# Patient Record
Sex: FEMALE | Race: WHITE | Employment: OTHER | URBAN - METROPOLITAN AREA
[De-identification: names, ages, dates, MRNs, and addresses within clinical notes are randomized per-mention and may not be internally consistent; named-entity substitution may affect disease eponyms.]

---

## 2017-07-24 PROBLEM — I10 ESSENTIAL HYPERTENSION: Status: ACTIVE | Noted: 2017-07-24

## 2017-09-07 ENCOUNTER — HOSPITAL ENCOUNTER (OUTPATIENT)
Age: 67
Discharge: HOME | End: 2017-09-07
Payer: MEDICARE

## 2017-09-07 DIAGNOSIS — I10 ESSENTIAL HYPERTENSION: ICD-10-CM

## 2017-09-07 LAB
ABSOLUTE EOS #: 0.2 K/UL (ref 0–0.4)
ABSOLUTE LYMPH #: 1.4 K/UL (ref 1–4.8)
ABSOLUTE MONO #: 0.5 K/UL (ref 0.1–1.3)
ALBUMIN SERPL-MCNC: 4.2 G/DL (ref 3.5–5.2)
ALBUMIN/GLOBULIN RATIO: ABNORMAL (ref 1–2.5)
ALP BLD-CCNC: 50 U/L (ref 35–104)
ALT SERPL-CCNC: 17 U/L (ref 5–33)
ANION GAP SERPL CALCULATED.3IONS-SCNC: 11 MMOL/L (ref 9–17)
AST SERPL-CCNC: 15 U/L
BASOPHILS # BLD: 1 %
BASOPHILS ABSOLUTE: 0.1 K/UL (ref 0–0.2)
BILIRUB SERPL-MCNC: 0.66 MG/DL (ref 0.3–1.2)
BUN BLDV-MCNC: 20 MG/DL (ref 8–23)
BUN/CREAT BLD: ABNORMAL (ref 9–20)
CALCIUM SERPL-MCNC: 9.2 MG/DL (ref 8.6–10.4)
CHLORIDE BLD-SCNC: 103 MMOL/L (ref 98–107)
CHOLESTEROL/HDL RATIO: 3.3
CHOLESTEROL: 211 MG/DL
CO2: 28 MMOL/L (ref 20–31)
CREAT SERPL-MCNC: 0.85 MG/DL (ref 0.5–0.9)
DIFFERENTIAL TYPE: ABNORMAL
EOSINOPHILS RELATIVE PERCENT: 5 %
GFR AFRICAN AMERICAN: >60 ML/MIN
GFR NON-AFRICAN AMERICAN: >60 ML/MIN
GFR SERPL CREATININE-BSD FRML MDRD: ABNORMAL ML/MIN/{1.73_M2}
GFR SERPL CREATININE-BSD FRML MDRD: ABNORMAL ML/MIN/{1.73_M2}
GLUCOSE BLD-MCNC: 104 MG/DL (ref 70–99)
HCT VFR BLD CALC: 45 % (ref 36–46)
HDLC SERPL-MCNC: 63 MG/DL
HEMOGLOBIN: 15.1 G/DL (ref 12–16)
LDL CHOLESTEROL: 118 MG/DL (ref 0–130)
LYMPHOCYTES # BLD: 28 %
MCH RBC QN AUTO: 30.2 PG (ref 26–34)
MCHC RBC AUTO-ENTMCNC: 33.5 G/DL (ref 31–37)
MCV RBC AUTO: 90.1 FL (ref 80–100)
MONOCYTES # BLD: 10 %
PDW BLD-RTO: 13.9 % (ref 11.5–14.9)
PLATELET # BLD: 142 K/UL (ref 150–450)
PLATELET ESTIMATE: ABNORMAL
PMV BLD AUTO: 9.7 FL (ref 6–12)
POTASSIUM SERPL-SCNC: 4.4 MMOL/L (ref 3.7–5.3)
RBC # BLD: 5 M/UL (ref 4–5.2)
RBC # BLD: ABNORMAL 10*6/UL
SEG NEUTROPHILS: 56 %
SEGMENTED NEUTROPHILS ABSOLUTE COUNT: 2.8 K/UL (ref 1.3–9.1)
SODIUM BLD-SCNC: 142 MMOL/L (ref 135–144)
TOTAL PROTEIN: 7.1 G/DL (ref 6.4–8.3)
TRIGL SERPL-MCNC: 149 MG/DL
TSH SERPL DL<=0.05 MIU/L-ACNC: 2.11 MIU/L (ref 0.3–5)
VLDLC SERPL CALC-MCNC: ABNORMAL MG/DL (ref 1–30)
WBC # BLD: 4.9 K/UL (ref 3.5–11)
WBC # BLD: ABNORMAL 10*3/UL

## 2017-09-07 PROCEDURE — 80053 COMPREHEN METABOLIC PANEL: CPT

## 2017-09-07 PROCEDURE — 36415 COLL VENOUS BLD VENIPUNCTURE: CPT

## 2017-09-07 PROCEDURE — 80061 LIPID PANEL: CPT

## 2017-09-07 PROCEDURE — 85025 COMPLETE CBC W/AUTO DIFF WBC: CPT

## 2017-09-07 PROCEDURE — 84443 ASSAY THYROID STIM HORMONE: CPT

## 2018-01-05 ENCOUNTER — HOSPITAL ENCOUNTER (OUTPATIENT)
Age: 68
Discharge: HOME | End: 2018-01-05
Payer: MEDICARE

## 2018-01-05 ENCOUNTER — HOSPITAL ENCOUNTER (OUTPATIENT)
Dept: GENERAL RADIOLOGY | Age: 68
Discharge: HOME | End: 2018-01-05
Payer: MEDICARE

## 2018-01-05 DIAGNOSIS — M79.605 PAIN OF LEFT LOWER EXTREMITY: ICD-10-CM

## 2018-01-05 PROCEDURE — 72100 X-RAY EXAM L-S SPINE 2/3 VWS: CPT

## 2018-01-22 PROBLEM — M54.50 ACUTE LEFT-SIDED LOW BACK PAIN: Status: ACTIVE | Noted: 2018-01-22

## 2018-02-02 ENCOUNTER — HOSPITAL ENCOUNTER (OUTPATIENT)
Dept: MRI IMAGING | Age: 68
Discharge: HOME | End: 2018-02-04
Payer: MEDICARE

## 2018-02-02 DIAGNOSIS — M54.42 ACUTE LEFT-SIDED LOW BACK PAIN WITH LEFT-SIDED SCIATICA: ICD-10-CM

## 2018-02-02 PROCEDURE — 72148 MRI LUMBAR SPINE W/O DYE: CPT

## 2018-02-05 PROBLEM — M51.26 LUMBAR HERNIATED DISC: Status: ACTIVE | Noted: 2018-02-05

## 2018-02-14 DIAGNOSIS — M54.42 ACUTE LEFT-SIDED LOW BACK PAIN WITH LEFT-SIDED SCIATICA: Primary | ICD-10-CM

## 2018-02-15 ENCOUNTER — HOSPITAL ENCOUNTER (OUTPATIENT)
Dept: GENERAL RADIOLOGY | Facility: CLINIC | Age: 68
Discharge: HOME | End: 2018-02-17
Payer: MEDICARE

## 2018-02-15 ENCOUNTER — HOSPITAL ENCOUNTER (OUTPATIENT)
Facility: CLINIC | Age: 68
Discharge: HOME | End: 2018-02-17
Payer: MEDICARE

## 2018-02-15 ENCOUNTER — INITIAL CONSULT (OUTPATIENT)
Dept: NEUROSURGERY | Age: 68
End: 2018-02-15
Payer: MEDICARE

## 2018-02-15 VITALS
SYSTOLIC BLOOD PRESSURE: 150 MMHG | DIASTOLIC BLOOD PRESSURE: 102 MMHG | BODY MASS INDEX: 31.39 KG/M2 | HEIGHT: 67 IN | WEIGHT: 200 LBS | HEART RATE: 66 BPM

## 2018-02-15 DIAGNOSIS — G89.29 CHRONIC MIDLINE LOW BACK PAIN WITH LEFT-SIDED SCIATICA: Primary | ICD-10-CM

## 2018-02-15 DIAGNOSIS — G89.29 CHRONIC MIDLINE LOW BACK PAIN WITH LEFT-SIDED SCIATICA: ICD-10-CM

## 2018-02-15 DIAGNOSIS — M54.42 CHRONIC MIDLINE LOW BACK PAIN WITH LEFT-SIDED SCIATICA: ICD-10-CM

## 2018-02-15 DIAGNOSIS — M54.42 CHRONIC MIDLINE LOW BACK PAIN WITH LEFT-SIDED SCIATICA: Primary | ICD-10-CM

## 2018-02-15 PROCEDURE — 72120 X-RAY BEND ONLY L-S SPINE: CPT

## 2018-02-15 PROCEDURE — 99203 OFFICE O/P NEW LOW 30 MIN: CPT | Performed by: NEUROLOGICAL SURGERY

## 2018-02-15 NOTE — PROGRESS NOTES
[facility] PHYSICIANS  [facility] NEUROSURGERY  [address] [address]  Dept: [phone]    Patient: Yazmin Hirsch  YOB: 1950  Date: 2/15/18    The patient is a 77 y.o. female who presents today for consult of the following problems:     Chief Complaint   Patient presents with    Other     Lumbar spondylotic myelopathy        Real Flores MD request and evaluation of lumbar spondylotic myelopathy. HPI:     Yazmin Hirsch is a 77 y.o.  female on whom neurosurgical consultation was requested by Real Flores MD for management of lumbar spondylotic myelopathy. The patient reports that her pain is predominantly in her left lower extremity although she occasionally does experience pain in the right lower extremity. Her pain level varies between 7 and 10/10. The pain is aggravated by standing for long periods of time. The pain is alleviated by sitting. She describes the pain as \"stabbing\" and \"numbness\". She denies all other neurological deficit. 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 Danny Lainez     Family History   Problem Relation Age of Onset    Cancer Mother      lung    High Blood Pressure Brother     Stroke Brother 40    High Blood Pressure Brother      Current Outpatient Prescriptions on File Prior to Visit   Medication Sig Dispense Refill    HYDROcodone-acetaminophen (NORCO)  MG per tablet Take 1 tablet by mouth 2 times daily for 15 days.  30 tablet 0    losartan (COZAAR) 100 MG tablet       carvedilol (COREG) 25 MG tablet Take 1 tablet by mouth 2 times daily 60 tablet 3    azelastine (OPTIVAR) 0.05 % ophthalmic solution Place 1 drop into both eyes 2 times daily 1 Bottle 1    Multiple Vitamins-Minerals (THERAPEUTIC MULTIVITAMIN-MINERALS) tablet Take 1 tablet by mouth daily      flyticasone (CUTIVATE) 0.005 % ointment Apply topically 2 times daily PRN 30 g 2     No current facility-administered medications on file prior to visit. Social History   Substance Use Topics    Smoking status: Never Smoker    Smokeless tobacco: Never Used    Alcohol use No       Allergies   Allergen Reactions    Formaldehyde      Any cosmetics, plastics, contact lens, glasses, nose guards to glasses cause localized irritation, redness, and itching. No Hives    Sulfa Antibiotics Swelling       Review of Systems  Constitutional: Negative for activity change and appetite change. HENT: Negative for ear pain and facial swelling. Eyes: Negative for discharge and itching. Respiratory: Negative for choking and chest tightness. Cardiovascular: Negative for chest pain and leg swelling. + Hypertension  Gastrointestinal: Negative for nausea and abdominal pain. + Diverticulitis  Endocrine: Negative for cold intolerance and heat intolerance. Genitourinary: Negative for frequency and flank pain. Musculoskeletal: Negative for myalgias and joint swelling. Skin: Negative for rash and wound. Allergic/Immunologic: Negative for environmental allergies and food allergies. Hematological: Negative for adenopathy. Does not bruise/bleed easily. Psychiatric/Behavioral: Negative for self-injury. The patient is not nervous/anxious. Physical Exam:      BP (!) 150/102   Pulse 66   Ht 5' 7\" (1.702 m)   Wt 200 lb (90.7 kg)   BMI 31.32 kg/m²   Estimated body mass index is 31.32 kg/m² as calculated from the following:    Height as of this encounter: 5' 7\" (1.702 m). Weight as of this encounter: 200 lb (90.7 kg). General:  Rafiq White is a 77y.o. year old female who appears her stated age.    HEENT: Normocephalic

## 2018-02-21 ENCOUNTER — HOSPITAL ENCOUNTER (OUTPATIENT)
Dept: PHYSICAL THERAPY | Age: 68
Setting detail: THERAPIES SERIES
Discharge: HOME | End: 2018-02-21
Payer: MEDICARE

## 2018-02-21 PROCEDURE — G8979 MOBILITY GOAL STATUS: HCPCS

## 2018-02-21 PROCEDURE — 97162 PT EVAL MOD COMPLEX 30 MIN: CPT

## 2018-02-21 PROCEDURE — 97110 THERAPEUTIC EXERCISES: CPT

## 2018-02-21 PROCEDURE — G8978 MOBILITY CURRENT STATUS: HCPCS

## 2018-02-21 NOTE — CONSULTS
[x] Yes  [] No Location: Low back pain radicular symptoms L LE to top of foot Pain Rating: (0-10 scale) 8-10/10  Pain altered Tx:  [] Yes  [] No  Action:  Symptoms:  [] Improving [] Worsening [x] Same  Better:  [] AM    [] PM    [] Sit    [] Rise/Sit    []Stand    [] Walk    [] Lying    [] Other:  Worse: [] AM    [] PM    [] Sit    [] Rise/Sit    [x]Stand    [] Walk    [x] Lying on side   [] Bend                             [] Valsalva    [] Other:  Sleep: [] OK    [x] Disturbed    Objective:      STRENGTH  STRENGTH  ROM    Left Right  Left Right Cervical    C5 Shld Abd   L1-2 Hip Flex 5 5 Flexion    Shld Flexion   Hip Abd 5 5 Extension    Shld IR   L3-4 Knee Ext 5 5 Rotation L R   Shld ER   L4 Ankle DF 4+ 5 Sidebend L R   C6 Elb Flex   L5 EHL 4+ 5 Retraction    C7 Elb Ext   S1 Plant. Flex   Lumbar    C8 EPL   Abdominals 4+ 4+ Flexion 90%p   T1 Fing Abd   Erector Spinae 4+ 4+ Extension 40%         Rotation L 50% R 75%      Right shifted   Sidebend L 50%p R75%         UE/LE                      Left rotated thoracic spine multisegmental                                            TESTS (+/-) LEFT RIGHT Not Tested   SLR [x] sit [] supine +  []   Hamstring (SLR)   []   SKTC   []   DKTC   []   Slump/Dural +  []   SI JT   []   EDWIN - - []   Joint Mobility   []   Cerv. Comp   []   Cerv. Distraction   []   Cerv. Alar/Transverse   []   Vertebral Artery   []   Adsons   []   Melvin Spaulding   []   Jo Tests ? Pain ? Pain No Change Not Tested   RFIS [] [] [] []   DERRICK [] [] [] []   RFIL [] [] [] []   REIL [] [] [] []   Rep Prot [] [] [] []   Rep Retract [] [] [] []     Pelvic obliquity/asymetry:  Positive standing forward bend left, Stork + left, left anterior rotated pallavi, left upslip.       OBSERVATION No Deficit Deficit Not Tested Comments   Posture       Forward Head [] [] []    Rounded Shoulders [] [] []    Kyphosis [] [x] [] Left rotated Tspine   Lordosis [] [] []    Lateral Shift [] [] []    Scoliosis [] [] []    Iliac Demo  [x] Written  Comprehension of Education:  [x] Verbalizes understanding. [x] Demonstrates understanding. [x] Needs Review. [] Demonstrates/verbalizes understanding of HEP/Ed previously given. Treatment Plan:  [x] Therapeutic Exercise    [x] Modalities:  [] Therapeutic Activity    [] Ultrasound  [x] Electrical Stimulation  [] Gait Training     [x]Massage       [x] Lumbar/Cervical Traction  [x] Neuromuscular Re-education [x] Cold/hotpack [] Iontophoresis: 4 mg/mL  [x] Instruction in HEP             Dexamethasone Sodium  [x] Manual Therapy             Phosphate 40-80 mAmin  [x] Aquatic Therapy   [x] Other: DRY NEEDLING    []  Medication allergies reviewed for use of    Dexamethasone Sodium Phosphate 4mg/ml     with iontophoresis treatments. Pt is not allergic. Frequency:  2 x/week for 18 visits    Todays Treatment:  Modalities:   Precautions:  Exercises:  Exercise Reps/ Time Weight/ Level Comments   SKTC 5sec hold x 10     Hooklie rotation 5 sec hold x 10                       Other:    Specific Instructions for next treatment:  MET pelvic corrections, DN, core and LE strength, flexion bias therex, LE flexibility    Treatment Charges: Mins Units   [x] Evaluation       []  Low       [x]  Moderate       []  High 45 1   []  Modalities     []  Ther Exercise 10 1   []  Manual Therapy     []  Ther Activities     []  Aquatics     []  Neuromuscular     []  Other       TOTAL TREATMENT TIME: 55    Time in: 0830     Time out: [facility]    Electronically signed by: David Laughlin PT        Physician Signature:________________________________Date:__________________  By signing above or cosigning this note, I have reviewed this plan of care and certify a need for medically necessary rehabilitation services.      *PLEASE SIGN ABOVE AND FAX BACK ALL PAGES*

## 2018-02-28 ENCOUNTER — APPOINTMENT (OUTPATIENT)
Dept: PHYSICAL THERAPY | Age: 68
End: 2018-02-28
Payer: MEDICARE

## 2018-03-07 PROBLEM — I10 ESSENTIAL HYPERTENSION: Status: ACTIVE | Noted: 2018-03-07

## 2018-04-04 ENCOUNTER — TELEPHONE (OUTPATIENT)
Dept: PHARMACY | Facility: CLINIC | Age: 68
End: 2018-04-04

## 2018-04-04 DIAGNOSIS — M54.16 LUMBAR RADICULOPATHY: Primary | ICD-10-CM

## 2018-04-04 PROCEDURE — 1111F DSCHRG MED/CURRENT MED MERGE: CPT

## 2018-04-04 RX ORDER — DOCUSATE SODIUM 100 MG/1
100 CAPSULE, LIQUID FILLED ORAL 2 TIMES DAILY PRN
COMMUNITY
End: 2019-06-24 | Stop reason: CLARIF

## 2018-04-04 RX ORDER — TIZANIDINE 2 MG/1
2 TABLET ORAL EVERY 8 HOURS PRN
COMMUNITY
End: 2018-10-17

## 2018-04-04 RX ORDER — OXYCODONE HYDROCHLORIDE AND ACETAMINOPHEN 5; 325 MG/1; MG/1
1 TABLET ORAL EVERY 4 HOURS PRN
COMMUNITY
End: 2018-10-24 | Stop reason: ALTCHOICE

## 2018-05-20 ENCOUNTER — HOSPITAL ENCOUNTER (EMERGENCY)
Age: 68
Discharge: HOME | End: 2018-05-20
Attending: EMERGENCY MEDICINE
Payer: MEDICARE

## 2018-05-20 VITALS
SYSTOLIC BLOOD PRESSURE: 135 MMHG | DIASTOLIC BLOOD PRESSURE: 79 MMHG | HEART RATE: 73 BPM | RESPIRATION RATE: 16 BRPM | TEMPERATURE: 97.8 F | WEIGHT: 203 LBS | HEIGHT: 66 IN | BODY MASS INDEX: 32.62 KG/M2 | OXYGEN SATURATION: 95 %

## 2018-05-20 DIAGNOSIS — M62.838 TRAPEZIUS MUSCLE SPASM: Primary | ICD-10-CM

## 2018-05-20 PROCEDURE — 6360000002 HC RX W HCPCS: Performed by: PHYSICIAN ASSISTANT

## 2018-05-20 PROCEDURE — 96372 THER/PROPH/DIAG INJ SC/IM: CPT

## 2018-05-20 PROCEDURE — 99283 EMERGENCY DEPT VISIT LOW MDM: CPT

## 2018-05-20 RX ORDER — TIZANIDINE 4 MG/1
4 TABLET ORAL EVERY 6 HOURS PRN
Qty: 12 TABLET | Refills: 0 | Status: SHIPPED | OUTPATIENT
Start: 2018-05-20 | End: 2018-10-17

## 2018-05-20 RX ORDER — ORPHENADRINE CITRATE 30 MG/ML
30 INJECTION INTRAMUSCULAR; INTRAVENOUS ONCE
Status: COMPLETED | OUTPATIENT
Start: 2018-05-20 | End: 2018-05-20

## 2018-05-20 RX ORDER — DEXAMETHASONE SODIUM PHOSPHATE 4 MG/ML
10 INJECTION, SOLUTION INTRA-ARTICULAR; INTRALESIONAL; INTRAMUSCULAR; INTRAVENOUS; SOFT TISSUE ONCE
Status: COMPLETED | OUTPATIENT
Start: 2018-05-20 | End: 2018-05-20

## 2018-05-20 RX ORDER — PREDNISONE 20 MG/1
20 TABLET ORAL DAILY
Qty: 5 TABLET | Refills: 0 | Status: SHIPPED | OUTPATIENT
Start: 2018-05-20 | End: 2018-05-25

## 2018-05-20 RX ADMIN — ORPHENADRINE CITRATE 30 MG: 30 INJECTION INTRAMUSCULAR; INTRAVENOUS at 18:13

## 2018-05-20 RX ADMIN — DEXAMETHASONE SODIUM PHOSPHATE 10 MG: 4 INJECTION, SOLUTION INTRAMUSCULAR; INTRAVENOUS at 18:14

## 2018-05-20 ASSESSMENT — PAIN SCALES - GENERAL: PAINLEVEL_OUTOF10: 10

## 2018-05-20 ASSESSMENT — PAIN DESCRIPTION - FREQUENCY: FREQUENCY: CONTINUOUS

## 2018-05-20 ASSESSMENT — ENCOUNTER SYMPTOMS: BOWEL INCONTINENCE: 0

## 2018-05-20 ASSESSMENT — PAIN DESCRIPTION - ORIENTATION: ORIENTATION: RIGHT

## 2018-05-20 ASSESSMENT — PAIN DESCRIPTION - LOCATION: LOCATION: NECK

## 2018-05-20 ASSESSMENT — PAIN DESCRIPTION - DESCRIPTORS: DESCRIPTORS: ACHING;SORE

## 2018-05-24 ENCOUNTER — HOSPITAL ENCOUNTER (OUTPATIENT)
Age: 68
Discharge: HOME | End: 2018-05-24
Payer: MEDICARE

## 2018-05-24 DIAGNOSIS — E78.5 HYPERLIPIDEMIA, UNSPECIFIED HYPERLIPIDEMIA TYPE: ICD-10-CM

## 2018-05-24 LAB
ALBUMIN SERPL-MCNC: 3.9 G/DL (ref 3.5–5.2)
ALBUMIN/GLOBULIN RATIO: NORMAL (ref 1–2.5)
ALP BLD-CCNC: 58 U/L (ref 35–104)
ALT SERPL-CCNC: 23 U/L (ref 5–33)
AST SERPL-CCNC: 15 U/L
BILIRUB SERPL-MCNC: 0.58 MG/DL (ref 0.3–1.2)
BILIRUBIN DIRECT: 0.16 MG/DL
BILIRUBIN, INDIRECT: 0.42 MG/DL (ref 0–1)
CHOLESTEROL/HDL RATIO: 2.2
CHOLESTEROL: 139 MG/DL
GLOBULIN: NORMAL G/DL (ref 1.5–3.8)
HDLC SERPL-MCNC: 62 MG/DL
LDL CHOLESTEROL: 56 MG/DL (ref 0–130)
TOTAL PROTEIN: 6.6 G/DL (ref 6.4–8.3)
TRIGL SERPL-MCNC: 107 MG/DL
VLDLC SERPL CALC-MCNC: NORMAL MG/DL (ref 1–30)

## 2018-05-24 PROCEDURE — 80076 HEPATIC FUNCTION PANEL: CPT

## 2018-05-24 PROCEDURE — 80061 LIPID PANEL: CPT

## 2018-05-24 PROCEDURE — 36415 COLL VENOUS BLD VENIPUNCTURE: CPT

## 2018-10-17 ENCOUNTER — HOSPITAL ENCOUNTER (EMERGENCY)
Age: 68
Discharge: HOME | End: 2018-10-17
Attending: EMERGENCY MEDICINE
Payer: MEDICARE

## 2018-10-17 VITALS
TEMPERATURE: 98 F | DIASTOLIC BLOOD PRESSURE: 87 MMHG | SYSTOLIC BLOOD PRESSURE: 146 MMHG | HEIGHT: 66 IN | RESPIRATION RATE: 16 BRPM | HEART RATE: 76 BPM | BODY MASS INDEX: 32.95 KG/M2 | WEIGHT: 205 LBS | OXYGEN SATURATION: 94 %

## 2018-10-17 DIAGNOSIS — M62.838 SPASM OF MUSCLE: Primary | ICD-10-CM

## 2018-10-17 PROCEDURE — 96372 THER/PROPH/DIAG INJ SC/IM: CPT

## 2018-10-17 PROCEDURE — 6370000000 HC RX 637 (ALT 250 FOR IP): Performed by: EMERGENCY MEDICINE

## 2018-10-17 PROCEDURE — 99283 EMERGENCY DEPT VISIT LOW MDM: CPT

## 2018-10-17 PROCEDURE — 6360000002 HC RX W HCPCS: Performed by: EMERGENCY MEDICINE

## 2018-10-17 RX ORDER — LIDOCAINE 4 G/G
1 PATCH TOPICAL DAILY
Qty: 10 PATCH | Refills: 0 | Status: SHIPPED | OUTPATIENT
Start: 2018-10-17 | End: 2019-06-24 | Stop reason: CLARIF

## 2018-10-17 RX ORDER — DIAZEPAM 5 MG/1
5 TABLET ORAL ONCE
Status: COMPLETED | OUTPATIENT
Start: 2018-10-17 | End: 2018-10-17

## 2018-10-17 RX ORDER — ORPHENADRINE CITRATE 30 MG/ML
60 INJECTION INTRAMUSCULAR; INTRAVENOUS ONCE
Status: COMPLETED | OUTPATIENT
Start: 2018-10-17 | End: 2018-10-17

## 2018-10-17 RX ORDER — TIZANIDINE 4 MG/1
4 TABLET ORAL EVERY 6 HOURS PRN
Qty: 30 TABLET | Refills: 0 | Status: SHIPPED | OUTPATIENT
Start: 2018-10-17 | End: 2019-01-17 | Stop reason: SDUPTHER

## 2018-10-17 RX ORDER — PREDNISONE 10 MG/1
50 TABLET ORAL DAILY
Qty: 25 TABLET | Refills: 0 | Status: SHIPPED | OUTPATIENT
Start: 2018-10-17 | End: 2018-10-22

## 2018-10-17 RX ORDER — LIDOCAINE 50 MG/G
1 PATCH TOPICAL ONCE
Status: DISCONTINUED | OUTPATIENT
Start: 2018-10-17 | End: 2018-10-18 | Stop reason: HOSPADM

## 2018-10-17 RX ORDER — KETOROLAC TROMETHAMINE 30 MG/ML
30 INJECTION, SOLUTION INTRAMUSCULAR; INTRAVENOUS ONCE
Status: COMPLETED | OUTPATIENT
Start: 2018-10-17 | End: 2018-10-17

## 2018-10-17 RX ADMIN — KETOROLAC TROMETHAMINE 30 MG: 30 INJECTION, SOLUTION INTRAMUSCULAR; INTRAVENOUS at 20:39

## 2018-10-17 RX ADMIN — ORPHENADRINE CITRATE 60 MG: 30 INJECTION INTRAMUSCULAR; INTRAVENOUS at 21:34

## 2018-10-17 RX ADMIN — DIAZEPAM 5 MG: 5 TABLET ORAL at 20:40

## 2018-10-17 ASSESSMENT — PAIN SCALES - GENERAL
PAINLEVEL_OUTOF10: 9
PAINLEVEL_OUTOF10: 10
PAINLEVEL_OUTOF10: 9

## 2018-10-17 ASSESSMENT — PAIN DESCRIPTION - PAIN TYPE: TYPE: ACUTE PAIN

## 2018-10-17 ASSESSMENT — PAIN DESCRIPTION - ORIENTATION: ORIENTATION: RIGHT

## 2018-10-17 ASSESSMENT — PAIN DESCRIPTION - LOCATION: LOCATION: NECK

## 2018-10-18 NOTE — ED PROVIDER NOTES
Take 1 tablet by mouth every 6 hours as needed (Muscle spasm)     Joselyn Albrecht MD  Attending Emergency Physician  [employer] voice recognition software used in portions of this document.                    Joselyn Albrecht MD  10/17/18 8812

## 2018-10-24 ENCOUNTER — HOSPITAL ENCOUNTER (OUTPATIENT)
Dept: GENERAL RADIOLOGY | Age: 68
Discharge: HOME | End: 2018-10-26
Payer: MEDICARE

## 2018-10-24 ENCOUNTER — HOSPITAL ENCOUNTER (OUTPATIENT)
Age: 68
Discharge: HOME | End: 2018-10-26
Payer: MEDICARE

## 2018-10-24 DIAGNOSIS — M54.2 NECK PAIN: ICD-10-CM

## 2018-10-24 PROCEDURE — 72040 X-RAY EXAM NECK SPINE 2-3 VW: CPT

## 2018-11-16 ENCOUNTER — HOSPITAL ENCOUNTER (OUTPATIENT)
Age: 68
Discharge: HOME | End: 2018-11-16
Payer: MEDICARE

## 2018-11-16 LAB
EKG ATRIAL RATE: 83 BPM
EKG P AXIS: 41 DEGREES
EKG P-R INTERVAL: 176 MS
EKG Q-T INTERVAL: 378 MS
EKG QRS DURATION: 80 MS
EKG QTC CALCULATION (BAZETT): 444 MS
EKG R AXIS: 21 DEGREES
EKG T AXIS: 41 DEGREES
EKG VENTRICULAR RATE: 83 BPM

## 2018-11-16 PROCEDURE — 93005 ELECTROCARDIOGRAM TRACING: CPT

## 2019-01-28 ENCOUNTER — HOSPITAL ENCOUNTER (OUTPATIENT)
Dept: MRI IMAGING | Facility: CLINIC | Age: 69
Discharge: HOME | End: 2019-01-30
Payer: MEDICARE

## 2019-01-28 DIAGNOSIS — M54.12 CERVICAL RADICULOPATHY: ICD-10-CM

## 2019-01-28 PROCEDURE — 72141 MRI NECK SPINE W/O DYE: CPT

## 2019-10-01 ENCOUNTER — HOSPITAL ENCOUNTER (OUTPATIENT)
Age: 69
Discharge: HOME | End: 2019-10-01
Payer: MEDICARE

## 2019-10-01 LAB
ALBUMIN SERPL-MCNC: 4.2 G/DL (ref 3.5–5.2)
ALBUMIN/GLOBULIN RATIO: ABNORMAL (ref 1–2.5)
ALP BLD-CCNC: 55 U/L (ref 35–104)
ALT SERPL-CCNC: 11 U/L (ref 5–33)
ANION GAP SERPL CALCULATED.3IONS-SCNC: 9 MMOL/L (ref 9–17)
AST SERPL-CCNC: 11 U/L
BILIRUB SERPL-MCNC: 0.88 MG/DL (ref 0.3–1.2)
BUN BLDV-MCNC: 19 MG/DL (ref 8–23)
BUN/CREAT BLD: ABNORMAL (ref 9–20)
CALCIUM SERPL-MCNC: 9.3 MG/DL (ref 8.6–10.4)
CHLORIDE BLD-SCNC: 106 MMOL/L (ref 98–107)
CHOLESTEROL/HDL RATIO: 2.4
CHOLESTEROL: 133 MG/DL
CO2: 26 MMOL/L (ref 20–31)
CREAT SERPL-MCNC: 0.82 MG/DL (ref 0.5–0.9)
GFR AFRICAN AMERICAN: >60 ML/MIN
GFR NON-AFRICAN AMERICAN: >60 ML/MIN
GFR SERPL CREATININE-BSD FRML MDRD: ABNORMAL ML/MIN/{1.73_M2}
GFR SERPL CREATININE-BSD FRML MDRD: ABNORMAL ML/MIN/{1.73_M2}
GLUCOSE BLD-MCNC: 104 MG/DL (ref 70–99)
HCT VFR BLD CALC: 42.8 % (ref 36–46)
HDLC SERPL-MCNC: 56 MG/DL
HEMOGLOBIN: 14.3 G/DL (ref 12–16)
LDL CHOLESTEROL: 50 MG/DL (ref 0–130)
MCH RBC QN AUTO: 30.6 PG (ref 26–34)
MCHC RBC AUTO-ENTMCNC: 33.4 G/DL (ref 31–37)
MCV RBC AUTO: 91.7 FL (ref 80–100)
NRBC AUTOMATED: ABNORMAL PER 100 WBC
PDW BLD-RTO: 13.2 % (ref 11.5–14.9)
PLATELET # BLD: 138 K/UL (ref 150–450)
PMV BLD AUTO: 9.4 FL (ref 6–12)
POTASSIUM SERPL-SCNC: 4.5 MMOL/L (ref 3.7–5.3)
RBC # BLD: 4.66 M/UL (ref 4–5.2)
SODIUM BLD-SCNC: 141 MMOL/L (ref 135–144)
THYROXINE, FREE: 1.49 NG/DL (ref 0.93–1.7)
TOTAL PROTEIN: 6.7 G/DL (ref 6.4–8.3)
TRIGL SERPL-MCNC: 133 MG/DL
TSH SERPL DL<=0.05 MIU/L-ACNC: 1.22 MIU/L (ref 0.3–5)
VLDLC SERPL CALC-MCNC: NORMAL MG/DL (ref 1–30)
WBC # BLD: 4.6 K/UL (ref 3.5–11)

## 2019-10-01 PROCEDURE — 85027 COMPLETE CBC AUTOMATED: CPT

## 2019-10-01 PROCEDURE — 80061 LIPID PANEL: CPT

## 2019-10-01 PROCEDURE — 80053 COMPREHEN METABOLIC PANEL: CPT

## 2019-10-01 PROCEDURE — 84443 ASSAY THYROID STIM HORMONE: CPT

## 2019-10-01 PROCEDURE — 36415 COLL VENOUS BLD VENIPUNCTURE: CPT

## 2019-10-01 PROCEDURE — 84439 ASSAY OF FREE THYROXINE: CPT

## 2020-03-25 PROBLEM — I10 ESSENTIAL HYPERTENSION: Status: RESOLVED | Noted: 2017-07-24 | Resolved: 2020-03-24

## 2020-11-03 PROBLEM — I10 HYPERTENSION: Status: RESOLVED | Noted: 2020-11-03 | Resolved: 2020-11-03

## 2020-12-21 PROBLEM — E66.01 MORBIDLY OBESE (HCC): Status: ACTIVE | Noted: 2020-12-21

## 2021-06-10 LAB — MAMMOGRAPHY, EXTERNAL: NORMAL

## 2022-02-24 ENCOUNTER — HOSPITAL ENCOUNTER (OUTPATIENT)
Age: 72
Discharge: HOME | End: 2022-02-24
Payer: MEDICARE

## 2022-02-24 DIAGNOSIS — Z00.00 WELLNESS EXAMINATION: ICD-10-CM

## 2022-02-24 LAB
ABSOLUTE EOS #: 0.2 K/UL (ref 0–0.4)
ABSOLUTE LYMPH #: 1.1 K/UL (ref 1–4.8)
ABSOLUTE MONO #: 0.4 K/UL (ref 0.1–1.3)
ALBUMIN SERPL-MCNC: 4.3 G/DL (ref 3.5–5.2)
ALP BLD-CCNC: 58 U/L (ref 35–104)
ALT SERPL-CCNC: 14 U/L (ref 5–33)
ANION GAP SERPL CALCULATED.3IONS-SCNC: 11 MMOL/L (ref 9–17)
AST SERPL-CCNC: 14 U/L
BASOPHILS # BLD: 1 % (ref 0–2)
BASOPHILS ABSOLUTE: 0 K/UL (ref 0–0.2)
BILIRUB SERPL-MCNC: 1.21 MG/DL (ref 0.3–1.2)
BUN BLDV-MCNC: 17 MG/DL (ref 8–23)
CALCIUM SERPL-MCNC: 9.3 MG/DL (ref 8.6–10.4)
CHLORIDE BLD-SCNC: 103 MMOL/L (ref 98–107)
CHOLESTEROL, FASTING: 141 MG/DL
CHOLESTEROL/HDL RATIO: 2.7
CO2: 27 MMOL/L (ref 20–31)
CREAT SERPL-MCNC: 1.01 MG/DL (ref 0.5–0.9)
EOSINOPHILS RELATIVE PERCENT: 3 % (ref 0–4)
GFR AFRICAN AMERICAN: >60 ML/MIN
GFR NON-AFRICAN AMERICAN: 54 ML/MIN
GFR SERPL CREATININE-BSD FRML MDRD: ABNORMAL ML/MIN/{1.73_M2}
GLUCOSE FASTING: 108 MG/DL (ref 70–99)
HCT VFR BLD CALC: 46.1 % (ref 36–46)
HDLC SERPL-MCNC: 53 MG/DL
HEMOGLOBIN: 15.5 G/DL (ref 12–16)
LDL CHOLESTEROL: 61 MG/DL (ref 0–130)
LYMPHOCYTES # BLD: 25 % (ref 24–44)
MCH RBC QN AUTO: 30 PG (ref 26–34)
MCHC RBC AUTO-ENTMCNC: 33.7 G/DL (ref 31–37)
MCV RBC AUTO: 89 FL (ref 80–100)
MONOCYTES # BLD: 9 % (ref 1–7)
PDW BLD-RTO: 14.3 % (ref 11.5–14.9)
PLATELET # BLD: 159 K/UL (ref 150–450)
PMV BLD AUTO: 9.7 FL (ref 6–12)
POTASSIUM SERPL-SCNC: 4.5 MMOL/L (ref 3.7–5.3)
RBC # BLD: 5.18 M/UL (ref 4–5.2)
SEG NEUTROPHILS: 62 % (ref 36–66)
SEGMENTED NEUTROPHILS ABSOLUTE COUNT: 2.8 K/UL (ref 1.3–9.1)
SODIUM BLD-SCNC: 141 MMOL/L (ref 135–144)
THYROXINE, FREE: 1.47 NG/DL (ref 0.93–1.7)
TOTAL PROTEIN: 6.7 G/DL (ref 6.4–8.3)
TRIGLYCERIDE, FASTING: 137 MG/DL
TSH SERPL DL<=0.05 MIU/L-ACNC: 1.78 MIU/L (ref 0.3–5)
WBC # BLD: 4.6 K/UL (ref 3.5–11)

## 2022-02-24 PROCEDURE — 84443 ASSAY THYROID STIM HORMONE: CPT

## 2022-02-24 PROCEDURE — 80061 LIPID PANEL: CPT

## 2022-02-24 PROCEDURE — 36415 COLL VENOUS BLD VENIPUNCTURE: CPT

## 2022-02-24 PROCEDURE — 80053 COMPREHEN METABOLIC PANEL: CPT

## 2022-02-24 PROCEDURE — 85025 COMPLETE CBC W/AUTO DIFF WBC: CPT

## 2022-02-24 PROCEDURE — 84439 ASSAY OF FREE THYROXINE: CPT

## 2022-06-20 PROBLEM — N18.30 CHRONIC RENAL DISEASE, STAGE III (HCC): Status: ACTIVE | Noted: 2022-06-20

## 2022-08-30 PROBLEM — M79.9 DISORDER OF MUSCULOSKELETAL SYSTEM: Status: ACTIVE | Noted: 2022-08-30

## 2022-08-30 PROBLEM — G89.4 CHRONIC PAIN DISORDER: Status: ACTIVE | Noted: 2022-08-30

## 2022-08-30 PROBLEM — R20.9 SKIN SENSATION DISTURBANCE: Status: ACTIVE | Noted: 2022-08-30

## 2022-08-30 PROBLEM — K57.30 DIVERTICULAR DISEASE OF COLON: Status: ACTIVE | Noted: 2022-08-30

## 2022-08-30 PROBLEM — M54.12 CERVICAL RADICULOPATHY: Status: ACTIVE | Noted: 2022-08-30

## 2022-08-30 PROBLEM — M47.812 CERVICAL SPONDYLOSIS WITHOUT MYELOPATHY: Status: ACTIVE | Noted: 2022-08-30

## 2022-08-30 PROBLEM — Z01.818 PREOP EXAMINATION: Status: ACTIVE | Noted: 2022-08-30

## 2022-08-30 PROBLEM — D05.90 CARCINOMA IN SITU OF BREAST: Status: ACTIVE | Noted: 2022-08-30

## 2022-08-30 NOTE — H&P (VIEW-ONLY)
HISTORY and [address]       NAME:  Chadd Mckenna  MRN: [id number]   YOB: 1950   Date: 8/31/2022   Age: 74 y.o. Gender: female     COMPLAINT AND PRESENT HISTORY:   Chadd Mckenna is 74 y.o.,  female, presents for pre-anesthesia/admission testing for LEFT BREAST LUMPECTOMY W/NEEDLE LOCALIZATION per Dr. Fab Clancy. Primary dx: LEFT BREAST CANCER. HPI:  Patient w/hx of abnormal mammogram- originally noted on 6-28-22 (done at [facility])- see imaging impression copied below:   IMPRESSION: INCOMPLETE: NEED ADDITIONAL IMAGING EVALUATION   The calcification in the left breast is indeterminate. Magnification   and additional views are recommended. Patient then completed diagnostic mammogram on 7-7-22, see imaging impression copied below:  IMPRESSION: LOW SUSPICION FOR MALIGNANCY   Left breast calcifications are suspicious of malignancy. A   stereotactic biopsy is recommended. Breast biopsy was done 7-27-22 (see reports below). RECENT IMAGING R/T HPI   Mammography post biopsy diagnostic unilateral left    Anatomical Region Laterality Modality   Breast left Mammography     Narrative       - MAMM POST BX DIAG UNI LT     UNILATERAL LEFT DIGITAL DIAGNOSTIC MAMMOGRAM WITH CAD: 7/27/2022   CLINICAL: Post biopsy. Current study was also evaluated with a Computer Aided Detection (CAD)    system. Comparison is made to exams dated:  7/7/2022 mammogram - [address] [place] [address], 6/28/2022 mammogram - [facility],   6/10/2021 mammogram, 6/5/2020 mammogram - [facility], and   5/20/2019 mammogram - [facility].       Biopsy marker clip is seen in the left breast and appears in   appropriate position. IMPRESSION: POST PROCEDURE MAMMOGRAMS FOR MARKER PLACEMENT   Post biopsy mammogram with marker clip in position as described above.        Annabelle Fung M.D.             tm/:7/27/2022 10:20:35        Mammography biopsy Pathology indicates   malignant DCIS grade 1, cribiform pattern. Consultation with a breast surgeon is now advised. I have instructed   the [facility] to contact the referring service as an adjunct to a formal   written report. Given the dense mammogram and imaging features of this    tumor, preoperative MRI is suggested. Wendy Barbosa M.D.   tm,rs/:2022 08:07:30    Surgical Pathology  Specimen:  Tissue - Left breast structure (body structure)  Narrative  Performed by Lavell Brumfield        [facility]        [address] [place] [address], [address] [place] [address]        Tel: [phone]         Fax: [phone]          Surgical Pathology Consultation        ADDENDUM [place]         Patient Mich JAIMES:1950 (Age: 72)Gender:FTaken:eported:hysician(s):Alcides Sun Cha, MD ([phone])[facility] Clinic[facility] #:[id number]. Rec. #:[id number]: #[id number]       Final Pathologic Diagnosis   Left breast; needle core biopsy:        Ductal carcinoma in situ (DCIS), grade 1, cribriform pattern, with calcifications; no necrosis. See comment. Comment: DCIS is noted in ~3 core biopsy fragments. The maximum linear dimension of DCIS as noted in these fragments of core biopsy specimen ranges from 1-3 mm in linear dimension. Immunohistochemistry (CK5/6; with appropriate and validated controls) is    supportive, revealing negative staining in the focus of DCIS. The background breast parenchyma is largely atrophic with focal mild fibrocystic changes. Studies for estrogen receptors and progesterone receptors by immunohistochemistry are being performed    and the results will be reported in an addendum.                 **Report Electronically Signed Out**   wak/2022Mckinley Arrieta MD ______________________________________________________________________________     Addendum [facility])               Date Reported: 8/1/2022     Patient states she states she also completed a breast MRI on 8-23-22. UPDATE:  She states she previously did not note any physical abnormalities to left breast (states was not doing self exams routinely), now she does note some pain to left breast since biopsy. Denies any nipple discoloration/shape changes/drainage. Denies fever/chills, denies recent unintended weight loss. States she did have a prior abnormal mammogram several years ago, had additional testing, did not need bx. Denies any known family hx of breast CA. Review of additional significant medical hx:  HTN, HLD: She states BP is normally stable, however BP is slightly elevated today. Denies current/recent chest pain, palpitations, SOB, dizziness, leg swelling, headache. She does take all medications for BP as prescribed. She does not follow w/a cardiologist. Patient denies any prior hx of abnormal EKG, however per chart a prior abnormal EKG from 11-16-18 is noted below:  Narrative & Impression    Poor data quality, interpretation may be adversely affected  Normal sinus rhythm  Cannot rule out Anterior infarct , age undetermined  Abnormal ECG  No previous ECGs available      Specimen Collected: 11/16/18 10:35 EST        Current medications r/t condition: LOSARTAN, COREG, AMLODIPINE, LIPITOR  BP Readings from Last 3 Encounters:   08/31/22 (!) 148/84   06/20/22 120/86   02/14/22 112/80      Activity level: Patient is fairly active. Functional Capacity per patient:              1. Patient IS able to walk 2 city blocks on level ground without SOB. 2. Patient IS able to climb 2 flights of stairs without SOB. Denies hx of MRSA infection. Denies hx of blood clots. Denies hx of any personal or family hx of complications w/anesthesia except hallucinated once post-op (patient states in the 1970's, no issues since then).    PAST MEDICAL HISTORY     Past Medical History:   Diagnosis Date    Abnormal EKG     Anesthesia hallucinated post op once, unsure why? Chronic renal disease, stage III [facility]) [579102] 2022    Colon polyp 2010    High grade dysplasia, Removed complete, clear margins    CTS (carpal tunnel syndrome)     B/l    Diverticulitis     Ductal carcinoma in situ (DCIS) of left breast     Roger's deformity 2013    left foot    Hyperlipidemia     Hypertension     Kidney infection     Hx of    Osteoarthritis     Pancolonic diverticulosis     Pneumonia     Vaginal atrophy        SURGICAL HISTORY       Past Surgical History:   Procedure Laterality Date    APPENDECTOMY      BACK SURGERY  2018    Lumbar laminectomy    BREAST SURGERY Bilateral     cyst removed    CATARACT REMOVAL WITH IMPLANT Right 2019    Dr. Brian Thornton Left 2018    Dr. Ada Snyder    COLONOSCOPY  2015    F-up in 10 years, Dr. Nafisa Sutherland History     Socioeconomic History    Marital status:      Spouse name: James Colón    Number of children: None    Years of education: None    Highest education level: None   Occupational History    Occupation: retired   Tobacco Use    Smoking status: Former     Types: Cigarettes     Quit date: 2017     Years since quittin.0    Smokeless tobacco: Never    Tobacco comments:     Smoked very, very rare.    Substance and Sexual Activity    Alcohol use: Yes     Comment: Social- not weekly    Drug use: No    Sexual activity: Yes     Partners: Male     Social Determinants of Health     Financial Resource Strain: Low Risk     Difficulty of Paying Living Expenses: Not hard at all   Food Insecurity: No Food Insecurity    Worried About [address] in the Last Year: Never true    [facility] in the Last Year: Never true       REVIEW OF SYSTEMS      Allergies   Allergen Reactions    Formaldehyde      Any cosmetics, plastics, contact lens, glasses, nose guards to glasses cause localized irritation, redness, and itching. No Hives    Sulfa Antibiotics Swelling       Current Outpatient Medications on File Prior to Encounter   Medication Sig Dispense Refill    losartan (COZAAR) 100 MG tablet TAKE 1 TABLET DAILY 90 tablet 3    carvedilol (COREG) 25 MG tablet TAKE 1 TABLET TWICE A  tablet 3    atorvastatin (LIPITOR) 20 MG tablet TAKE 1 TABLET DAILY 90 tablet 1    amLODIPine (NORVASC) 5 MG tablet TAKE 1 TABLET DAILY 90 tablet 1    HYDROcodone-acetaminophen (NORCO) 7.5-325 MG per tablet TAKE 1 TABLET BY MOUTH TWICE A DAY AS NEEDED  0    tiZANidine (ZANAFLEX) 4 MG tablet Take 1 tablet by mouth 2 times daily as needed (Muscle spasm) 60 tablet 1    azelastine (OPTIVAR) 0.05 % ophthalmic solution Place 1 drop into both eyes 2 times daily 1 Bottle 1    flyticasone (CUTIVATE) 0.005 % ointment Apply topically 2 times daily PRN 30 g 2     No current facility-administered medications on file prior to encounter. Review of Systems   Constitutional:  Negative for chills and fever. HENT:  Negative for congestion, ear pain, rhinorrhea, sore throat and trouble swallowing. Respiratory:  Negative for apnea (Denies any known hx of sleep apnea), cough, shortness of breath and wheezing. Cardiovascular:  Negative for chest pain, palpitations and leg swelling. Gastrointestinal:  Negative for abdominal pain, anal bleeding, blood in stool, constipation, diarrhea, nausea and vomiting. Genitourinary:  Negative for dysuria and frequency. Skin:  Negative for rash and wound. Neurological:  Negative for dizziness and headaches. Hematological:  Does not bruise/bleed easily. GENERAL PHYSICAL EXAM     Vitals: BP (!) 148/84   Pulse 60 Comment: Per auscultation  Temp 98.4 °F (36.9 °C)   Resp 18   Ht 5' 7\" (1.702 m)   Wt 214 lb (97.1 kg)   SpO2 97%   BMI 33.52 kg/m²               Physical Exam  Vitals reviewed. Constitutional:       General: She is not in acute distress.      Appearance: 08/31/2022    PROT 6.7 02/24/2022    LABALBU 4.3 02/24/2022    BILITOT 1.21 (H) 02/24/2022    ALKPHOS 58 02/24/2022    AST 14 02/24/2022    ALT 14 02/24/2022    LABGLOM >60 08/31/2022    GFRAA >60 08/31/2022    GLOB NOT REPORTED 05/24/2018     Lab Results   Component Value Date    WBC 4.9 08/31/2022    HGB 15.1 08/31/2022    HCT 45.3 08/31/2022    MCV 90.6 08/31/2022     08/31/2022     PRELIMINARY EKG REVIEW, DATE: 8-31-22     SINUS BRADYCARDIA  LOW VOLTAGE QRS  BORDERLINE ECG  50 BPM    SURGERY / PROVISIONAL DIAGNOSES:      BREAST LUMPECTOMY W/NEEDLE LOCALIZATION    LEFT BREAST CANCER    Patient Active Problem List    Diagnosis Date Noted    Preop examination 08/30/2022    Carcinoma in situ of breast 08/30/2022    Cervical radiculopathy 08/30/2022    Cervical spondylosis without myelopathy 08/30/2022    Chronic pain disorder 08/30/2022    Disorder of musculoskeletal system 08/30/2022    Diverticular disease of colon 08/30/2022    Skin sensation disturbance 08/30/2022    Chronic renal disease, stage III [facility]) [869470] 06/20/2022    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 Surgery scheduling will notify Dr. Brenda Schwartz office who will be responsible for making sure the clearance is obtained and is in the chart for surgery.     Total time spent on encounter- PAT provider minutes: 31-40 minutes     ALBERT Cuevas CNP on 8/31/2022 at 10:11 AM

## 2022-08-30 NOTE — H&P
HISTORY and Venecia Iniguez [address]       NAME:  Cassidy Talavera  MRN: [id number]   YOB: 1950   Date: 8/31/2022   Age: 74 y.o. Gender: female     COMPLAINT AND PRESENT HISTORY:   Cassidy Talavera is 74 y.o.,  female, presents for pre-anesthesia/admission testing for LEFT BREAST LUMPECTOMY W/NEEDLE LOCALIZATION per Dr. Chiquita Bertrand. Primary dx: LEFT BREAST CANCER. HPI:  Patient w/hx of abnormal mammogram- originally noted on 6-28-22 (done at [facility])- see imaging impression copied below:   IMPRESSION: INCOMPLETE: NEED ADDITIONAL IMAGING EVALUATION   The calcification in the left breast is indeterminate. Magnification   and additional views are recommended. Patient then completed diagnostic mammogram on 7-7-22, see imaging impression copied below:  IMPRESSION: LOW SUSPICION FOR MALIGNANCY   Left breast calcifications are suspicious of malignancy. A   stereotactic biopsy is recommended. Breast biopsy was done 7-27-22 (see reports below). RECENT IMAGING R/T HPI   Mammography post biopsy diagnostic unilateral left    Anatomical Region Laterality Modality   Breast left Mammography     Narrative       - MAMM POST BX DIAG UNI LT     UNILATERAL LEFT DIGITAL DIAGNOSTIC MAMMOGRAM WITH CAD: 7/27/2022   CLINICAL: Post biopsy. Current study was also evaluated with a Computer Aided Detection (CAD)    system. Comparison is made to exams dated:  7/7/2022 mammogram - [address] [place] [address], 6/28/2022 mammogram - [facility],   6/10/2021 mammogram, 6/5/2020 mammogram - [facility], and   5/20/2019 mammogram - [facility].       Biopsy marker clip is seen in the left breast and appears in   appropriate position. IMPRESSION: POST PROCEDURE MAMMOGRAMS FOR MARKER PLACEMENT   Post biopsy mammogram with marker clip in position as described above.        Tatum Castro M.D.             tm/:7/27/2022 10:20:35        Mammography biopsy breast stereotactic guidance initial left    Anatomical Region Laterality Modality   Breast left Mammography     Narrative       - MAMM BX BREAST STEREO GUID INITIAL LT     STEREOTACTIC GUIDED BIOPSY LEFT BREAST WITH POST DIGITAL MAMMOGRAPHIC   IMAGIN2022   CLINICAL: Abnormal Findings On Diagnostic Imaging Of Breast.       PATIENT CONSENT: The benefits and potential risks of the procedure   were explained to the patient and an informed written consent was   obtained. Correlation is made to exams dated:  2022 mammogram - [address] [place] [address], 2022 mammogram - [facility],   6/10/2021 mammogram, and 2020 mammogram - [facility]. A stereotactic guided biopsy was performed for the area of clustered   calcifications located in the left breast at 2 o'clock middle depth. This was described on the previous mammography report. The skin was   prepped in the usual manner. Local anesthetic was administered to the    access site. The abnormality was approached using a prone table. A   9 gauge biopsy needle was placed adjacent to the abnormality under   computer guidance and confirmatory stereotactic mammography images   were obtained to document needle placement. Once the needle was   documented to be in the correct location, multiple specimens were   obtained using an automated biopsy gun. A skin closure strip was   applied to the access site. As a separate procedure in a separate   room with a dedicated machine, post procedure digital mammographic   imaging demonstrates the clip at the targeted area. The specimens   were sent to the laboratory for pathological analysis. The biopsy   specimens were radiographed and the specimens containing calcium were   inked for pathology. IMPRESSION: STEREOTACTIC GUIDED BIOPSY MALIGNANT   Stereotactic guided biopsy of the area of clustered calcifications in   the left breast middle depth was successful. Pathology indicates   malignant DCIS grade 1, cribiform pattern. Consultation with a breast surgeon is now advised. I have instructed   the [email or web address] to contact the referring service as an adjunct to a formal   written report. Given the dense mammogram and imaging features of this    tumor, preoperative MRI is suggested. Wood Orozco M.D.   tm,rs/:2022 08:07:30    Surgical Pathology  Specimen:  Tissue - Left breast structure (body structure)  Narrative  Performed by Philipp Andrews        [facility] in Laboratory Medicine        [address] [place] [address], [address] [place] [address]        Tel: [phone]         Fax: [phone]          Surgical Pathology Consultation        ADDENDUM NV         Patient Gar Coach JAIMES:1950 (Age: 72)Gender:FTaken:eported:hysician(s):Alcides Arana MD ([phone])VBYM Jed Spatz, [facility] Hospitalession #:[id number]. Rec. #:[id number]: #[id number]       Final Pathologic Diagnosis   Left breast; needle core biopsy:        Ductal carcinoma in situ (DCIS), grade 1, cribriform pattern, with calcifications; no necrosis. See comment. Comment: DCIS is noted in ~3 core biopsy fragments. The maximum linear dimension of DCIS as noted in these fragments of core biopsy specimen ranges from 1-3 mm in linear dimension. Immunohistochemistry (CK5/6; with appropriate and validated controls) is    supportive, revealing negative staining in the focus of DCIS. The background breast parenchyma is largely atrophic with focal mild fibrocystic changes. Studies for estrogen receptors and progesterone receptors by immunohistochemistry are being performed    and the results will be reported in an addendum.                 **Report Electronically Signed Out**   wak/2022Mckinley Amaral MD ______________________________________________________________________________     Addendum [facility])               Date Reported: 8/1/2022     Patient states she states she also completed a breast MRI on 8-23-22. UPDATE:  She states she previously did not note any physical abnormalities to left breast (states was not doing self exams routinely), now she does note some pain to left breast since biopsy. Denies any nipple discoloration/shape changes/drainage. Denies fever/chills, denies recent unintended weight loss. States she did have a prior abnormal mammogram several years ago, had additional testing, did not need bx. Denies any known family hx of breast CA. Review of additional significant medical hx:  HTN, HLD: She states BP is normally stable, however BP is slightly elevated today. Denies current/recent chest pain, palpitations, SOB, dizziness, leg swelling, headache. She does take all medications for BP as prescribed. She does not follow w/a cardiologist. Patient denies any prior hx of abnormal EKG, however per chart a prior abnormal EKG from 11-16-18 is noted below:  Narrative & Impression    Poor data quality, interpretation may be adversely affected  Normal sinus rhythm  Cannot rule out Anterior infarct , age undetermined  Abnormal ECG  No previous ECGs available      Specimen Collected: 11/16/18 10:35 EST        Current medications r/t condition: LOSARTAN, COREG, AMLODIPINE, LIPITOR  BP Readings from Last 3 Encounters:   08/31/22 (!) 148/84   06/20/22 120/86   02/14/22 112/80      Activity level: Patient is fairly active. Functional Capacity per patient:              1. Patient IS able to walk 2 city blocks on level ground without SOB. 2. Patient IS able to climb 2 flights of stairs without SOB. Denies hx of MRSA infection. Denies hx of blood clots. Denies hx of any personal or family hx of complications w/anesthesia except hallucinated once post-op (patient states in the 1970's, no issues since then).    PAST MEDICAL HISTORY     Past Medical History:   Diagnosis Date    Abnormal EKG     Anesthesia hallucinated post op once, unsure why? 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 Jia Diaz Left 2018    Dr. Supa Edwards    COLONOSCOPY  2015    F-up in 10 years, Dr. Marco Stevens History     Socioeconomic History    Marital status:      Spouse name: Paula Bolton    Number of children: None    Years of education: None    Highest education level: None   Occupational History    Occupation: retired   Tobacco Use    Smoking status: Former     Types: Cigarettes     Quit date: 2017     Years since quittin.0    Smokeless tobacco: Never    Tobacco comments:     Smoked very, very rare.    Substance and Sexual Activity    Alcohol use: Yes     Comment: Social- not weekly    Drug use: No    Sexual activity: Yes     Partners: Male     Social Determinants of Health     Financial Resource Strain: Low Risk     Difficulty of Paying Living Expenses: Not hard at all   Food Insecurity: No Food Insecurity    Worried About [address] in the Last Year: Never true    [facility] in the Last Year: Never true       REVIEW OF SYSTEMS      Allergies   Allergen Reactions    Formaldehyde      Any cosmetics, plastics, contact lens, glasses, nose guards to glasses cause localized irritation, redness, and itching. No Hives    Sulfa Antibiotics Swelling       Current Outpatient Medications on File Prior to Encounter   Medication Sig Dispense Refill    losartan (COZAAR) 100 MG tablet TAKE 1 TABLET DAILY 90 tablet 3    carvedilol (COREG) 25 MG tablet TAKE 1 TABLET TWICE A  tablet 3    atorvastatin (LIPITOR) 20 MG tablet TAKE 1 TABLET DAILY 90 tablet 1    amLODIPine (NORVASC) 5 MG tablet TAKE 1 TABLET DAILY 90 tablet 1    HYDROcodone-acetaminophen (NORCO) 7.5-325 MG per tablet TAKE 1 TABLET BY MOUTH TWICE A DAY AS NEEDED  0    tiZANidine (ZANAFLEX) 4 MG tablet Take 1 tablet by mouth 2 times daily as needed (Muscle spasm) 60 tablet 1    azelastine (OPTIVAR) 0.05 % ophthalmic solution Place 1 drop into both eyes 2 times daily 1 Bottle 1    flyticasone (CUTIVATE) 0.005 % ointment Apply topically 2 times daily PRN 30 g 2     No current facility-administered medications on file prior to encounter. Review of Systems   Constitutional:  Negative for chills and fever. HENT:  Negative for congestion, ear pain, rhinorrhea, sore throat and trouble swallowing. Respiratory:  Negative for apnea (Denies any known hx of sleep apnea), cough, shortness of breath and wheezing. Cardiovascular:  Negative for chest pain, palpitations and leg swelling. Gastrointestinal:  Negative for abdominal pain, anal bleeding, blood in stool, constipation, diarrhea, nausea and vomiting. Genitourinary:  Negative for dysuria and frequency. Skin:  Negative for rash and wound. Neurological:  Negative for dizziness and headaches. Hematological:  Does not bruise/bleed easily. GENERAL PHYSICAL EXAM     Vitals: BP (!) 148/84   Pulse 60 Comment: Per auscultation  Temp 98.4 °F (36.9 °C)   Resp 18   Ht 5' 7\" (1.702 m)   Wt 214 lb (97.1 kg)   SpO2 97%   BMI 33.52 kg/m²               Physical Exam  Vitals reviewed. Constitutional:       General: She is not in acute distress.      Appearance: She is well-developed. She is obese. She is not ill-appearing, toxic-appearing or diaphoretic. HENT:      Head: Normocephalic. Right Ear: External ear normal.      Left Ear: External ear normal.      Nose: Nose normal.      Mouth/Throat:      Dentition: Abnormal dentition (Right permanent partial bridge- bottom). Pharynx: No oropharyngeal exudate or posterior oropharyngeal erythema. Tonsils: No tonsillar abscesses. Eyes:      General:         Right eye: No discharge. Left eye: No discharge. Conjunctiva/sclera: Conjunctivae normal.      Pupils: Pupils are equal, round, and reactive to light. Cardiovascular:      Rate and Rhythm: Normal rate and regular rhythm. Pulses: Intact distal pulses. Heart sounds: Normal heart sounds. Comments: 60 BPM per auscultation during exam.  Pulmonary:      Effort: Pulmonary effort is normal. No accessory muscle usage or respiratory distress. Breath sounds: Normal breath sounds. No decreased breath sounds, wheezing, rhonchi or rales. Chest:      Comments: Breast exam deferred. Abdominal:      General: Bowel sounds are normal. There is no distension. Palpations: Abdomen is soft. There is no mass. Tenderness: There is no abdominal tenderness. There is no guarding or rebound. Musculoskeletal:      Right lower leg: No swelling or tenderness. No edema. Left lower leg: No swelling or tenderness. No edema. Comments: Negative Michael's sign b/l. Lymphadenopathy:      Cervical: No cervical adenopathy. Skin:     General: Skin is warm and dry. Neurological:      Mental Status: She is alert and oriented to person, place, and time.    Psychiatric:         Behavior: Behavior normal.       LAB REVIEW     Lab Results   Component Value Date     08/31/2022    K 4.4 08/31/2022     08/31/2022    CO2 30 08/31/2022    BUN 20 08/31/2022    CREATININE 0.88 08/31/2022    GLUCOSE 109 (H) 08/31/2022    CALCIUM 9.6 08/31/2022    PROT 6.7 02/24/2022    LABALBU 4.3 02/24/2022    BILITOT 1.21 (H) 02/24/2022    ALKPHOS 58 02/24/2022    AST 14 02/24/2022    ALT 14 02/24/2022    LABGLOM >60 08/31/2022    GFRAA >60 08/31/2022    GLOB NOT REPORTED 05/24/2018     Lab Results   Component Value Date    WBC 4.9 08/31/2022    HGB 15.1 08/31/2022    HCT 45.3 08/31/2022    MCV 90.6 08/31/2022     08/31/2022     PRELIMINARY EKG REVIEW, DATE: 8-31-22     SINUS BRADYCARDIA  LOW VOLTAGE QRS  BORDERLINE ECG  50 BPM    SURGERY / PROVISIONAL DIAGNOSES:      BREAST LUMPECTOMY W/NEEDLE LOCALIZATION    LEFT BREAST CANCER    Patient Active Problem List    Diagnosis Date Noted    Preop examination 08/30/2022    Carcinoma in situ of breast 08/30/2022    Cervical radiculopathy 08/30/2022    Cervical spondylosis without myelopathy 08/30/2022    Chronic pain disorder 08/30/2022    Disorder of musculoskeletal system 08/30/2022    Diverticular disease of colon 08/30/2022    Skin sensation disturbance 08/30/2022    Chronic renal disease, stage III 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 Surgery scheduling will notify Dr. Jin Frausto office who will be responsible for making sure the clearance is obtained and is in the chart for surgery.     Total time spent on encounter- PAT provider minutes: 31-40 minutes     ALBERT Brock CNP on 8/31/2022 at 10:11 AM

## 2022-08-31 ENCOUNTER — HOSPITAL ENCOUNTER (OUTPATIENT)
Dept: PREADMISSION TESTING | Age: 72
Discharge: HOME | End: 2022-09-04
Attending: SURGERY | Admitting: SURGERY
Payer: MEDICARE

## 2022-08-31 VITALS
HEIGHT: 67 IN | WEIGHT: 214 LBS | TEMPERATURE: 98.4 F | SYSTOLIC BLOOD PRESSURE: 148 MMHG | BODY MASS INDEX: 33.59 KG/M2 | DIASTOLIC BLOOD PRESSURE: 84 MMHG | RESPIRATION RATE: 18 BRPM | OXYGEN SATURATION: 97 % | HEART RATE: 60 BPM

## 2022-08-31 DIAGNOSIS — Z01.818 PREOP EXAMINATION: ICD-10-CM

## 2022-08-31 LAB
ABSOLUTE EOS #: 0.2 K/UL (ref 0–0.4)
ABSOLUTE LYMPH #: 1.4 K/UL (ref 1–4.8)
ABSOLUTE MONO #: 0.5 K/UL (ref 0.1–1.3)
ANION GAP SERPL CALCULATED.3IONS-SCNC: 9 MMOL/L (ref 9–17)
BASOPHILS # BLD: 1 % (ref 0–2)
BASOPHILS ABSOLUTE: 0 K/UL (ref 0–0.2)
BUN BLDV-MCNC: 20 MG/DL (ref 8–23)
CALCIUM SERPL-MCNC: 9.6 MG/DL (ref 8.6–10.4)
CHLORIDE BLD-SCNC: 104 MMOL/L (ref 98–107)
CO2: 30 MMOL/L (ref 20–31)
CREAT SERPL-MCNC: 0.88 MG/DL (ref 0.5–0.9)
EOSINOPHILS RELATIVE PERCENT: 4 % (ref 0–4)
GFR AFRICAN AMERICAN: >60 ML/MIN
GFR NON-AFRICAN AMERICAN: >60 ML/MIN
GFR SERPL CREATININE-BSD FRML MDRD: ABNORMAL ML/MIN/{1.73_M2}
GLUCOSE BLD-MCNC: 109 MG/DL (ref 70–99)
HCT VFR BLD CALC: 45.3 % (ref 36–46)
HEMOGLOBIN: 15.1 G/DL (ref 12–16)
LYMPHOCYTES # BLD: 29 % (ref 24–44)
MCH RBC QN AUTO: 30.1 PG (ref 26–34)
MCHC RBC AUTO-ENTMCNC: 33.3 G/DL (ref 31–37)
MCV RBC AUTO: 90.6 FL (ref 80–100)
MONOCYTES # BLD: 10 % (ref 1–7)
PDW BLD-RTO: 13.8 % (ref 11.5–14.9)
PLATELET # BLD: 152 K/UL (ref 150–450)
PMV BLD AUTO: 9 FL (ref 6–12)
POTASSIUM SERPL-SCNC: 4.4 MMOL/L (ref 3.7–5.3)
RBC # BLD: 5 M/UL (ref 4–5.2)
SEG NEUTROPHILS: 56 % (ref 36–66)
SEGMENTED NEUTROPHILS ABSOLUTE COUNT: 2.8 K/UL (ref 1.3–9.1)
SODIUM BLD-SCNC: 143 MMOL/L (ref 135–144)
WBC # BLD: 4.9 K/UL (ref 3.5–11)

## 2022-08-31 PROCEDURE — 85025 COMPLETE CBC W/AUTO DIFF WBC: CPT

## 2022-08-31 PROCEDURE — APPSS45 APP SPLIT SHARED TIME 31-45 MINUTES: Performed by: NURSE PRACTITIONER

## 2022-08-31 PROCEDURE — 80048 BASIC METABOLIC PNL TOTAL CA: CPT

## 2022-08-31 PROCEDURE — 93005 ELECTROCARDIOGRAM TRACING: CPT | Performed by: NURSE PRACTITIONER

## 2022-08-31 ASSESSMENT — ENCOUNTER SYMPTOMS
CONSTIPATION: 0
ABDOMINAL PAIN: 0
COUGH: 0
SORE THROAT: 0
ANAL BLEEDING: 0
WHEEZING: 0
BLOOD IN STOOL: 0
APNEA: 0
RHINORRHEA: 0
SHORTNESS OF BREATH: 0
VOMITING: 0
NAUSEA: 0
DIARRHEA: 0
TROUBLE SWALLOWING: 0

## 2022-08-31 NOTE — DISCHARGE INSTRUCTIONS
Pre-op Instructions For Out-Patient Surgery    Medication Instructions:  Please stop herbs and any supplements now (includes vitamins and minerals). Please contact your surgeon and prescribing physician for pre-op instructions for any blood thinners. If you have inhalers/aerosol treatments at home, please use them the morning of your surgery and bring the inhalers with you to the hospital.    Please take the following medications the morning of your surgery with a sip of water:    Losartan, Carvedilol, Amlodipine    Surgery Instructions:  After midnight before surgery:  Do not eat or drink anything, including water, mints, gum, and hard candy. You may brush your teeth without swallowing. No smoking, chewing tobacco, or street drugs. Please shower or bathe before surgery. If you were given Surgical Scrub Chlorhexidine Gluconate Liquid (CHG), please shower the night before and the morning of your surgery following the detailed instructions you received during your pre-admission visit. Please do not wear any cologne, lotion, powder, deodorant, jewelry, piercings, perfume, makeup, nail polish, hair accessories, or hair spray on the day of surgery. Wear loose comfortable clothing. Leave your valuables at home. Bring a storage case for any glasses/contacts. An adult who is responsible for you MUST drive you home and should be with you for the first 24 hours after surgery. If having out-patient knee and foot surgeries, please arrange for planned crutches, walker, or wheelchair before arriving to the hospital.    The Day of Surgery:  Arrive at [facility] AT [facility] Surgery Entrance at the time directed by your surgeon and check in at the desk. If you have a living will or healthcare power of , please bring a copy. You will be taken to the pre-op holding area where you will be prepared for surgery.   A physical assessment will be performed by a nurse practitioner or house officer. Your IV will be started and you will meet your anesthesiologist.    When you go to surgery, your family will be directed to the surgical waiting room, where the doctor should speak with them after your surgery. After surgery, you will be taken to the recovery room then when you are awake and stable you will go to the short stay unit for preparation to be discharged. If you use a Bi-PAP or C-PAP machine, please bring it with you and leave it in the car in case it is needed in recovery room.

## 2022-09-01 LAB
EKG ATRIAL RATE: 50 BPM
EKG P AXIS: 49 DEGREES
EKG P-R INTERVAL: 172 MS
EKG Q-T INTERVAL: 440 MS
EKG QRS DURATION: 84 MS
EKG QTC CALCULATION (BAZETT): 401 MS
EKG R AXIS: 34 DEGREES
EKG T AXIS: 50 DEGREES
EKG VENTRICULAR RATE: 50 BPM

## 2022-09-01 PROCEDURE — 93010 ELECTROCARDIOGRAM REPORT: CPT | Performed by: INTERNAL MEDICINE

## 2022-09-08 ENCOUNTER — ANESTHESIA EVENT (OUTPATIENT)
Dept: OPERATING ROOM | Age: 72
End: 2022-09-08
Payer: MEDICARE

## 2022-09-09 ENCOUNTER — ANESTHESIA (OUTPATIENT)
Dept: OPERATING ROOM | Age: 72
End: 2022-09-09
Payer: MEDICARE

## 2022-09-09 ENCOUNTER — HOSPITAL ENCOUNTER (OUTPATIENT)
Dept: WOMENS IMAGING | Age: 72
Discharge: HOME | End: 2022-09-11
Attending: SURGERY
Payer: MEDICARE

## 2022-09-09 ENCOUNTER — HOSPITAL ENCOUNTER (OUTPATIENT)
Age: 72
Setting detail: OUTPATIENT SURGERY
Discharge: HOME | End: 2022-09-09
Attending: SURGERY | Admitting: SURGERY
Payer: MEDICARE

## 2022-09-09 ENCOUNTER — APPOINTMENT (OUTPATIENT)
Dept: WOMENS IMAGING | Age: 72
End: 2022-09-09
Attending: SURGERY
Payer: MEDICARE

## 2022-09-09 VITALS
SYSTOLIC BLOOD PRESSURE: 144 MMHG | BODY MASS INDEX: 33.27 KG/M2 | OXYGEN SATURATION: 94 % | TEMPERATURE: 96.6 F | DIASTOLIC BLOOD PRESSURE: 73 MMHG | HEIGHT: 67 IN | HEART RATE: 60 BPM | RESPIRATION RATE: 14 BRPM | WEIGHT: 212 LBS

## 2022-09-09 DIAGNOSIS — D05.12 DUCTAL CARCINOMA IN SITU (DCIS) OF LEFT BREAST: Primary | ICD-10-CM

## 2022-09-09 DIAGNOSIS — R92.8 ABNORMAL MAMMOGRAM: ICD-10-CM

## 2022-09-09 DIAGNOSIS — C50.912 MALIGNANT NEOPLASM OF LEFT FEMALE BREAST, UNSPECIFIED ESTROGEN RECEPTOR STATUS, UNSPECIFIED SITE OF BREAST (HCC): ICD-10-CM

## 2022-09-09 PROCEDURE — 7100000000 HC PACU RECOVERY - FIRST 15 MIN: Performed by: SURGERY

## 2022-09-09 PROCEDURE — 7100000031 HC ASPR PHASE II RECOVERY - ADDTL 15 MIN: Performed by: SURGERY

## 2022-09-09 PROCEDURE — 88341 IMHCHEM/IMCYTCHM EA ADD ANTB: CPT

## 2022-09-09 PROCEDURE — 6360000002 HC RX W HCPCS: Performed by: SURGERY

## 2022-09-09 PROCEDURE — 7100000030 HC ASPR PHASE II RECOVERY - FIRST 15 MIN: Performed by: SURGERY

## 2022-09-09 PROCEDURE — 88307 TISSUE EXAM BY PATHOLOGIST: CPT

## 2022-09-09 PROCEDURE — 3700000001 HC ADD 15 MINUTES (ANESTHESIA): Performed by: SURGERY

## 2022-09-09 PROCEDURE — 2709999900 HC NON-CHARGEABLE SUPPLY: Performed by: SURGERY

## 2022-09-09 PROCEDURE — 7100000010 HC PHASE II RECOVERY - FIRST 15 MIN: Performed by: SURGERY

## 2022-09-09 PROCEDURE — 2500000003 HC RX 250 WO HCPCS: Performed by: NURSE ANESTHETIST, CERTIFIED REGISTERED

## 2022-09-09 PROCEDURE — 88342 IMHCHEM/IMCYTCHM 1ST ANTB: CPT

## 2022-09-09 PROCEDURE — 3700000000 HC ANESTHESIA ATTENDED CARE: Performed by: SURGERY

## 2022-09-09 PROCEDURE — 2500000003 HC RX 250 WO HCPCS: Performed by: SURGERY

## 2022-09-09 PROCEDURE — 3600000002 HC SURGERY LEVEL 2 BASE: Performed by: SURGERY

## 2022-09-09 PROCEDURE — 2580000003 HC RX 258: Performed by: ANESTHESIOLOGY

## 2022-09-09 PROCEDURE — 3600000012 HC SURGERY LEVEL 2 ADDTL 15MIN: Performed by: SURGERY

## 2022-09-09 PROCEDURE — C1819 TISSUE LOCALIZATION-EXCISION: HCPCS

## 2022-09-09 PROCEDURE — 19281 PERQ DEVICE BREAST 1ST IMAG: CPT

## 2022-09-09 PROCEDURE — 7100000011 HC PHASE II RECOVERY - ADDTL 15 MIN: Performed by: SURGERY

## 2022-09-09 PROCEDURE — 6360000002 HC RX W HCPCS: Performed by: NURSE ANESTHETIST, CERTIFIED REGISTERED

## 2022-09-09 PROCEDURE — 7100000001 HC PACU RECOVERY - ADDTL 15 MIN: Performed by: SURGERY

## 2022-09-09 RX ORDER — SODIUM CHLORIDE 0.9 % (FLUSH) 0.9 %
5-40 SYRINGE (ML) INJECTION EVERY 12 HOURS SCHEDULED
Status: DISCONTINUED | OUTPATIENT
Start: 2022-09-09 | End: 2022-09-09 | Stop reason: HOSPADM

## 2022-09-09 RX ORDER — SODIUM CHLORIDE 0.9 % (FLUSH) 0.9 %
5-40 SYRINGE (ML) INJECTION PRN
Status: DISCONTINUED | OUTPATIENT
Start: 2022-09-09 | End: 2022-09-09 | Stop reason: HOSPADM

## 2022-09-09 RX ORDER — ROCURONIUM BROMIDE 10 MG/ML
INJECTION, SOLUTION INTRAVENOUS PRN
Status: DISCONTINUED | OUTPATIENT
Start: 2022-09-09 | End: 2022-09-09 | Stop reason: SDUPTHER

## 2022-09-09 RX ORDER — DOXYCYCLINE HYCLATE 100 MG
100 TABLET ORAL 2 TIMES DAILY
Qty: 20 TABLET | Refills: 0 | Status: SHIPPED | OUTPATIENT
Start: 2022-09-09 | End: 2022-09-19

## 2022-09-09 RX ORDER — PROPOFOL 10 MG/ML
INJECTION, EMULSION INTRAVENOUS PRN
Status: DISCONTINUED | OUTPATIENT
Start: 2022-09-09 | End: 2022-09-09 | Stop reason: SDUPTHER

## 2022-09-09 RX ORDER — LIDOCAINE HYDROCHLORIDE 10 MG/ML
1 INJECTION, SOLUTION EPIDURAL; INFILTRATION; INTRACAUDAL; PERINEURAL
Status: DISCONTINUED | OUTPATIENT
Start: 2022-09-09 | End: 2022-09-09 | Stop reason: HOSPADM

## 2022-09-09 RX ORDER — LIDOCAINE HYDROCHLORIDE 20 MG/ML
INJECTION, SOLUTION EPIDURAL; INFILTRATION; INTRACAUDAL; PERINEURAL PRN
Status: DISCONTINUED | OUTPATIENT
Start: 2022-09-09 | End: 2022-09-09 | Stop reason: SDUPTHER

## 2022-09-09 RX ORDER — BUPIVACAINE HYDROCHLORIDE 5 MG/ML
INJECTION, SOLUTION EPIDURAL; INTRACAUDAL PRN
Status: DISCONTINUED | OUTPATIENT
Start: 2022-09-09 | End: 2022-09-09 | Stop reason: ALTCHOICE

## 2022-09-09 RX ORDER — KETOROLAC TROMETHAMINE 30 MG/ML
INJECTION, SOLUTION INTRAMUSCULAR; INTRAVENOUS PRN
Status: DISCONTINUED | OUTPATIENT
Start: 2022-09-09 | End: 2022-09-09 | Stop reason: SDUPTHER

## 2022-09-09 RX ORDER — DEXAMETHASONE SODIUM PHOSPHATE 4 MG/ML
INJECTION, SOLUTION INTRA-ARTICULAR; INTRALESIONAL; INTRAMUSCULAR; INTRAVENOUS; SOFT TISSUE PRN
Status: DISCONTINUED | OUTPATIENT
Start: 2022-09-09 | End: 2022-09-09 | Stop reason: SDUPTHER

## 2022-09-09 RX ORDER — LABETALOL HYDROCHLORIDE 5 MG/ML
10 INJECTION, SOLUTION INTRAVENOUS
Status: DISCONTINUED | OUTPATIENT
Start: 2022-09-09 | End: 2022-09-09 | Stop reason: HOSPADM

## 2022-09-09 RX ORDER — OXYCODONE HYDROCHLORIDE AND ACETAMINOPHEN 5; 325 MG/1; MG/1
1 TABLET ORAL EVERY 6 HOURS PRN
Qty: 28 TABLET | Refills: 0 | Status: SHIPPED | OUTPATIENT
Start: 2022-09-09 | End: 2022-09-16

## 2022-09-09 RX ORDER — CEPHALEXIN 500 MG/1
CAPSULE ORAL
Qty: 21 CAPSULE | Refills: 0 | Status: SHIPPED | OUTPATIENT
Start: 2022-09-09

## 2022-09-09 RX ORDER — FENTANYL CITRATE 50 UG/ML
INJECTION, SOLUTION INTRAMUSCULAR; INTRAVENOUS PRN
Status: DISCONTINUED | OUTPATIENT
Start: 2022-09-09 | End: 2022-09-09 | Stop reason: SDUPTHER

## 2022-09-09 RX ORDER — ONDANSETRON 2 MG/ML
4 INJECTION INTRAMUSCULAR; INTRAVENOUS
Status: DISCONTINUED | OUTPATIENT
Start: 2022-09-09 | End: 2022-09-09 | Stop reason: HOSPADM

## 2022-09-09 RX ORDER — SODIUM CHLORIDE 9 MG/ML
INJECTION, SOLUTION INTRAVENOUS PRN
Status: DISCONTINUED | OUTPATIENT
Start: 2022-09-09 | End: 2022-09-09 | Stop reason: HOSPADM

## 2022-09-09 RX ORDER — METOCLOPRAMIDE HYDROCHLORIDE 5 MG/ML
10 INJECTION INTRAMUSCULAR; INTRAVENOUS
Status: DISCONTINUED | OUTPATIENT
Start: 2022-09-09 | End: 2022-09-09 | Stop reason: HOSPADM

## 2022-09-09 RX ORDER — FENTANYL CITRATE 50 UG/ML
25 INJECTION, SOLUTION INTRAMUSCULAR; INTRAVENOUS EVERY 5 MIN PRN
Status: DISCONTINUED | OUTPATIENT
Start: 2022-09-09 | End: 2022-09-09 | Stop reason: HOSPADM

## 2022-09-09 RX ORDER — ONDANSETRON 2 MG/ML
INJECTION INTRAMUSCULAR; INTRAVENOUS PRN
Status: DISCONTINUED | OUTPATIENT
Start: 2022-09-09 | End: 2022-09-09 | Stop reason: SDUPTHER

## 2022-09-09 RX ORDER — ONDANSETRON 4 MG/1
4 TABLET, FILM COATED ORAL DAILY PRN
Qty: 30 TABLET | Refills: 0 | Status: SHIPPED | OUTPATIENT
Start: 2022-09-09

## 2022-09-09 RX ORDER — HYDRALAZINE HYDROCHLORIDE 20 MG/ML
10 INJECTION INTRAMUSCULAR; INTRAVENOUS
Status: DISCONTINUED | OUTPATIENT
Start: 2022-09-09 | End: 2022-09-09 | Stop reason: HOSPADM

## 2022-09-09 RX ORDER — SODIUM CHLORIDE, SODIUM LACTATE, POTASSIUM CHLORIDE, CALCIUM CHLORIDE 600; 310; 30; 20 MG/100ML; MG/100ML; MG/100ML; MG/100ML
INJECTION, SOLUTION INTRAVENOUS CONTINUOUS
Status: DISCONTINUED | OUTPATIENT
Start: 2022-09-09 | End: 2022-09-09 | Stop reason: HOSPADM

## 2022-09-09 RX ORDER — DIPHENHYDRAMINE HYDROCHLORIDE 50 MG/ML
12.5 INJECTION INTRAMUSCULAR; INTRAVENOUS
Status: DISCONTINUED | OUTPATIENT
Start: 2022-09-09 | End: 2022-09-09 | Stop reason: HOSPADM

## 2022-09-09 RX ORDER — MEPERIDINE HYDROCHLORIDE 25 MG/ML
12.5 INJECTION INTRAMUSCULAR; INTRAVENOUS; SUBCUTANEOUS EVERY 5 MIN PRN
Status: DISCONTINUED | OUTPATIENT
Start: 2022-09-09 | End: 2022-09-09 | Stop reason: HOSPADM

## 2022-09-09 RX ADMIN — CEFAZOLIN 2000 MG: 10 INJECTION, POWDER, FOR SOLUTION INTRAVENOUS at 09:26

## 2022-09-09 RX ADMIN — ONDANSETRON 4 MG: 2 INJECTION INTRAMUSCULAR; INTRAVENOUS at 09:48

## 2022-09-09 RX ADMIN — KETOROLAC TROMETHAMINE 15 MG: 30 INJECTION, SOLUTION INTRAMUSCULAR; INTRAVENOUS at 10:18

## 2022-09-09 RX ADMIN — SUGAMMADEX 200 MG: 100 INJECTION, SOLUTION INTRAVENOUS at 10:21

## 2022-09-09 RX ADMIN — FENTANYL CITRATE 50 MCG: 50 INJECTION, SOLUTION INTRAMUSCULAR; INTRAVENOUS at 09:32

## 2022-09-09 RX ADMIN — LIDOCAINE HYDROCHLORIDE 60 MG: 20 INJECTION, SOLUTION EPIDURAL; INFILTRATION; INTRACAUDAL; PERINEURAL at 09:32

## 2022-09-09 RX ADMIN — SODIUM CHLORIDE, POTASSIUM CHLORIDE, SODIUM LACTATE AND CALCIUM CHLORIDE: 600; 310; 30; 20 INJECTION, SOLUTION INTRAVENOUS at 06:31

## 2022-09-09 RX ADMIN — PROPOFOL 170 MG: 10 INJECTION, EMULSION INTRAVENOUS at 09:32

## 2022-09-09 RX ADMIN — DEXAMETHASONE SODIUM PHOSPHATE 4 MG: 4 INJECTION, SOLUTION INTRAMUSCULAR; INTRAVENOUS at 09:48

## 2022-09-09 RX ADMIN — ROCURONIUM BROMIDE 50 MG: 10 INJECTION INTRAVENOUS at 09:33

## 2022-09-09 ASSESSMENT — ENCOUNTER SYMPTOMS
STRIDOR: 0
SHORTNESS OF BREATH: 0

## 2022-09-09 ASSESSMENT — PAIN - FUNCTIONAL ASSESSMENT: PAIN_FUNCTIONAL_ASSESSMENT: 0-10

## 2022-09-09 ASSESSMENT — PAIN DESCRIPTION - ORIENTATION: ORIENTATION: LEFT;OUTER

## 2022-09-09 ASSESSMENT — PAIN SCALES - GENERAL: PAINLEVEL_OUTOF10: 0

## 2022-09-09 ASSESSMENT — LIFESTYLE VARIABLES: SMOKING_STATUS: 0

## 2022-09-09 ASSESSMENT — PAIN DESCRIPTION - LOCATION: LOCATION: BREAST

## 2022-09-09 NOTE — ANESTHESIA POSTPROCEDURE EVALUATION
POST- ANESTHESIA EVALUATION       Pt Name: Jono Palacios  MRN: [id number]  YOB: 1950  Date of evaluation: 9/9/2022  Time:  12:40 PM      BP (!) 144/73   Pulse 60   Temp (!) 96.6 °F (35.9 °C) (Infrared)   Resp 14   Ht 5' 7\" (1.702 m)   Wt 212 lb (96.2 kg)   SpO2 94%   BMI 33.20 kg/m²      Consciousness Level  Awake  Cardiopulmonary Status  Stable  Pain Adequately Treated YES  Nausea / Vomiting  NO  Adequate Hydration  YES  Anesthesia Related Complications NONE      Electronically signed by Shaila Simon MD on 9/9/2022 at 12:40 PM       Department of Anesthesiology  Postprocedure Note    Patient:  Jono Palacios  MRN: [id number]  YOB: 1950  Date of evaluation: 9/9/2022      Procedure Summary     Date: 09/09/22 Room / Location: [address] [place] [address] Oscar [facility]    Anesthesia Start: 6280 Anesthesia Stop: 3062    Procedure: BREAST LUMPECTOMY W/NEEDLE LOCALIZATION (Left: Breast) Diagnosis:       Malignant neoplasm of left female breast, unspecified estrogen receptor status, unspecified site of breast ([facility] [address]      (LEFT BREAST CANCER)    Surgeons: Yadiel Perdue MD Responsible Provider: Shaila Simon MD    Anesthesia Type: General ASA Status: 2          Anesthesia Type: General    Lashell Phase I: Lashell Score: 10    Lashell Phase II: Lashell Score: 10      Anesthesia Post Evaluation

## 2022-09-09 NOTE — ANESTHESIA PRE PROCEDURE
Department of Anesthesiology  Preprocedure Note       Name:  Brenton Barcenas   Age:  74 y.o.  :  1950                                          MRN:  [id number]         Date:  2022      Surgeon: Bridget Hardin):  Dilcia White MD    Procedure: Procedure(s):  BREAST LUMPECTOMY W/NEEDLE LOCALIZATION    Medications prior to admission:   Prior to Admission medications    Medication Sig Start Date End Date Taking? Authorizing Provider   losartan (COZAAR) 100 MG tablet TAKE 1 TABLET DAILY 22   Bandar Franklin MD   carvedilol (COREG) 25 MG tablet TAKE 1 TABLET TWICE A DAY 22   Bandar Franklin MD   atorvastatin (LIPITOR) 20 MG tablet TAKE 1 TABLET DAILY 3/22/22   Bandar Franklin MD   amLODIPine (NORVASC) 5 MG tablet TAKE 1 TABLET DAILY 3/22/22   Bandar Franklin MD   HYDROcodone-acetaminophen (Arva Lauren) 7.5-325 MG per tablet TAKE 1 TABLET BY MOUTH TWICE A DAY AS NEEDED 19   Historical Provider, MD   tiZANidine (ZANAFLEX) 4 MG tablet Take 1 tablet by mouth 2 times daily as needed (Muscle spasm) 19   Bandar Franklin MD   azelastine (OPTIVAR) 0.05 % ophthalmic solution Place 1 drop into both eyes 2 times daily 17   Carmen Del Castillo MD   flyticasone (CUTIVATE) 0.005 % ointment Apply topically 2 times daily PRN 13   Norma Quezada PA-C       Current medications:    Current Facility-Administered Medications   Medication Dose Route Frequency Provider Last Rate Last Admin    ceFAZolin (ANCEF) 2000 mg in dextrose 5 % 50 mL IVPB  2,000 mg IntraVENous Once Dilcia White MD        lactated ringers infusion   IntraVENous Continuous Marcela Lindquist  mL/hr at 22 0631 New Bag at 22 0631    lidocaine PF 1 % injection 1 mL  1 mL IntraDERmal Once PRN Marcela Lindquist MD           Allergies:     Allergies   Allergen Reactions    Formaldehyde      Any cosmetics, plastics, contact lens, glasses, nose guards to glasses cause localized irritation, redness, and itching. 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    High grade dysplasia in colonic adenoma D12.6    Vaginal atrophy N95.2    Acute left-sided low back pain M54.50    Lumbar herniated disc M51.26    Essential hypertension I10    Neck pain M54.2    Morbidly obese ([facility] [address] E66.01    Chronic renal disease, stage III ([address] [289105] N18.30    Preop examination Z01.818    Carcinoma in situ of breast D05.90    Cervical radiculopathy M54.12    Cervical spondylosis without myelopathy M47.812    Chronic pain disorder G89.4    Disorder of musculoskeletal system M79.9    Diverticular disease of colon K57.30    Skin sensation disturbance R20.9       Past Medical History:        Diagnosis Date    Abnormal EKG     Anesthesia     hallucinated post op once, unsure why?     Chronic renal disease, stage III [facility]) [871264] 06/20/2022    Colon polyp 07/22/2010    High grade dysplasia, Removed complete, clear margins    CTS (carpal tunnel syndrome)     B/l    Diverticulitis     Ductal carcinoma in situ (DCIS) of left breast     Roger's deformity 02/28/2013    left foot    Hyperlipidemia     Hypertension     Kidney infection     Hx of    Osteoarthritis     Pancolonic diverticulosis     Pneumonia     Vaginal atrophy 2013       Past Surgical History:        Procedure Laterality Date    APPENDECTOMY      BACK SURGERY  2018    Lumbar laminectomy    BREAST SURGERY Bilateral     cyst removed    CATARACT REMOVAL WITH IMPLANT Right 02/04/2019    Dr. Ale Rdz Left 12/03/2018    Dr. Pratik Abrams    COLONOSCOPY  11/03/2015    F-up in 10 years, Dr. Martínez Fuel History:    Social History     Tobacco Use    Smoking status: Former     Types: Cigarettes     Quit date: 8/18/2017     Years since quittin.0    Smokeless tobacco: Never    Tobacco comments:     Smoked very, very rare. Substance Use Topics    Alcohol use: Yes     Comment: Social- not weekly                                Counseling given: Not Answered  Tobacco comments: Smoked very, very rare. Vital Signs (Current):   Vitals:    22 0557   BP: (!) 160/85   Pulse: 54   Resp: 16   Temp: 97.3 °F (36.3 °C)   TempSrc: Infrared   SpO2: 97%   Weight: 212 lb (96.2 kg)   Height: 5' 7\" (1.702 m)                                              BP Readings from Last 3 Encounters:   22 (!) 160/85   22 (!) 148/84   22 120/76       NPO Status: Time of last liquid consumption:                         Time of last solid consumption:                         Date of last liquid consumption: 22                        Date of last solid food consumption: 22    BMI:   Wt Readings from Last 3 Encounters:   22 212 lb (96.2 kg)   22 214 lb (97.1 kg)   22 212 lb (96.2 kg)     Body mass index is 33.2 kg/m².     CBC:   Lab Results   Component Value Date/Time    WBC 4.9 2022 08:40 AM    RBC 5.00 2022 08:40 AM    HGB 15.1 2022 08:40 AM    HCT 45.3 2022 08:40 AM    MCV 90.6 2022 08:40 AM    RDW 13.8 2022 08:40 AM     2022 08:40 AM     LR    CMP:   Lab Results   Component Value Date/Time     2022 08:40 AM    K 4.4 2022 08:40 AM     2022 08:40 AM    CO2 30 2022 08:40 AM    BUN 20 2022 08:40 AM    CREATININE 0.88 2022 08:40 AM    GFRAA >60 2022 08:40 AM    LABGLOM >60 2022 08:40 AM    GLUCOSE 109 2022 08:40 AM    PROT 6.7 2022 08:29 AM    CALCIUM 9.6 2022 08:40 AM    BILITOT 1.21 2022 08:29 AM    ALKPHOS 58 2022 08:29 AM    AST 14 2022 08:29 AM    ALT 14 2022 08:29 AM       POC Tests: No results for input(s): POCGLU, POCNA, POCK, POCCL, POCBUN, POCHEMO, POCHCT in the last 72 hours. Coags: No results found for: PROTIME, INR, APTT    HCG (If Applicable): No results found for: PREGTESTUR, PREGSERUM, HCG, HCGQUANT     ABGs: No results found for: PHART, PO2ART, LAM4PNT, TXS4ATL, BEART, G8FBRFFL     Type & Screen (If Applicable):  No results found for: LABABO, LABRH    Drug/Infectious Status (If Applicable):  No results found for: HIV, HEPCAB    COVID-19 Screening (If Applicable): No results found for: COVID19        Anesthesia Evaluation  Patient summary reviewed and Nursing notes reviewed no history of anesthetic complications:   Airway: Mallampati: II  TM distance: >3 FB   Neck ROM: full  Mouth opening: > = 3 FB   Dental:          Pulmonary:normal exam  breath sounds clear to auscultation  (+) pneumonia: no interval change and resolved,      (-) COPD, asthma, shortness of breath, recent URI, sleep apnea, rhonchi, wheezes, rales, stridor, not a current smoker and no decreased breath sounds          Patient did not smoke on day of surgery. Cardiovascular:  Exercise tolerance: good (>4 METS),   (+) hypertension: no interval change,     (-) pacemaker, valvular problems/murmurs, past MI, CAD, CABG/stent, dysrhythmias,  angina,  CHF, orthopnea, PND,  HOLDEN, murmur, weak pulses,  friction rub, systolic click, carotid bruit,  JVD, peripheral edema, no pulmonary hypertension and no hyperlipidemia    ECG reviewed  Rhythm: regular  Rate: normal           Beta Blocker:  Dose within 24 Hrs         Neuro/Psych:      (-) seizures, neuromuscular disease, TIA, CVA, headaches, psychiatric history and depression/anxiety            GI/Hepatic/Renal: Neg GI/Hepatic/Renal ROS       (-) hiatal hernia, GERD, PUD, hepatitis, liver disease, no renal disease, bowel prep and no morbid obesity       Endo/Other: Negative Endo/Other ROS   (+) no malignancy/cancer.     (-) diabetes mellitus, hypothyroidism, hyperthyroidism, blood dyscrasia, arthritis, no electrolyte abnormalities, no malignancy/cancer               Abdominal:             Vascular: negative vascular ROS. - PVD, DVT and PE. Other Findings:           Anesthesia Plan      general     ASA 2       Induction: intravenous. MIPS: Postoperative opioids intended. Anesthetic plan and risks discussed with patient. Plan discussed with CRNA.                     Frederic Love MD   9/9/2022

## 2022-09-09 NOTE — DISCHARGE INSTRUCTIONS
Dr. Nolasco Heads Orders for Outpatient    1.) Diet:    [x]  As tolerated  []  Special     2.) Activity:    [x]  No lifting more than five to ten pounds 1-2 weeks  [x]  May Shower tomorrow  [x]  No driving until off pain medications     3.) Dressing:    [x]  Remove top dressing in 48 hours   [x]  Leave steri strips on     [x]  Remove and change dressing sooner if soaked    4.) Medication:    [x]  Take medication as prescribed   []  Resume blood thinners in  days    [x]  Resume home medications per AVS Summary    5.) Office visit: Follow up with Dr. Justina Grullon. Call to schedule an appointment if one has not been made. 6.) Call [phone] if you have any questions or concerns. Electronically signed by Deepika Ponce MD on 9/9/2022 at 10:35 AM DISCHARGE INSTRUCTIONS FOR GENERAL ANESTHESIA    In order to continue your care at home, please follow the instructions below. Anesthesia - Do not drink any alcoholic beverages or make any legal or important decisions for 24 hours. If your surgery was on an extremity or abdomen, do not drive or operate any machinery until your surgeon approves, otherwise do not drive or operate any machinery for 24 hours or as restricted by your surgeon. Pain Medications - Please do not drive, operate machinery, or drink alcoholic beverages while taking any prescribed pain medication. Diet -  Start out eating lightly (broth, soup, crackers, toast, etc.) advancing as tolerated to your usual diet. Try to avoid spicy and greasy/fatty foods for 24 hours. Drink plenty of fluids after surgery, unless you are on a fluid restriction. Avoid milk/milk product for several hours. Call your surgeon for the following: You have pain that does not get better after you take pain medicine. For an oral temperature (by mouth) is 101 degrees or higher, chills, or excessive sweating. You have increasing and progressive bleeding or drainage from surgery site.   Signs of an infection: increased swelling, redness, warmth, or hardness around surgery area or yellow or green drainage. Persistent nausea or vomiting and cant keep fluids down. If you are unable to urinate within 8 hours of surgery. Redness or swelling at IV site. For any questions or concerns you may have. Walk in

## 2022-09-09 NOTE — OP NOTE
Operative Note      Patient: Ayesha Abrams  YOB: 1950  MRN: [id number]    Date of Procedure: 2022              Preoperative diagnosis: Left breast cancer DCIS    Postoperative diagnosis: Same    Procedure: Left breast lumpectomy with needle localization    Surgeon: Dr. Rosalva Apgar    Asst.: None    Anesthesia: General    Preparation: Chloraprep    EBL: Less than 25 mL    Specimen: Left breast lumpectomy. Specimen mammogram reveals localization wire and the clip in the specimen. Procedure: Informed consent was obtained. Site was marked and confirmed. Preoperative antibiotic was given. Patient was taken to the operating room. Images were reviewed. General anesthesia was given. Operative site was prepped and draped in usual sterile fashion. Timeout was done. Incision was made around the localizing wire. Incision was deepened with help of Bovie cautery. Breast tissue was grasped with help of 2 Allis clamps. Core of breast tissue was removed around the localizing wire. Specimen was submitted to mammography. Radiologist called and confirmed the biopsy clip and the wire in the specimen. Wound was explored. Hemostasis was confirmed. Sponge needle instrument count was found to be correct. Wound was approximated using absorbable sutures. Local anesthetic was infiltrated. Dermabond was applied. Steri-Strips applied. Sterile dressing was applied. Patient tolerated procedure well and was transferred to the recovery room in a stable condition.  -  Recommendations: There is no family here to discuss the findings. Discharge instructions in the chart. Prescriptions called in.

## 2022-09-13 LAB — SURGICAL PATHOLOGY REPORT: NORMAL

## 2022-09-29 PROBLEM — Z01.818 PREOP EXAMINATION: Status: RESOLVED | Noted: 2022-08-30 | Resolved: 2022-09-29

## 2023-01-06 PROBLEM — C50.912 MALIGNANT NEOPLASM OF LEFT FEMALE BREAST, UNSPECIFIED ESTROGEN RECEPTOR STATUS, UNSPECIFIED SITE OF BREAST (HCC): Status: ACTIVE | Noted: 2023-01-06

## 2023-02-16 ENCOUNTER — HOSPITAL ENCOUNTER (OUTPATIENT)
Dept: WOMENS IMAGING | Age: 73
Discharge: HOME | End: 2023-02-18
Payer: MEDICARE

## 2023-02-16 DIAGNOSIS — D05.12 DUCTAL CARCINOMA IN SITU (DCIS) OF LEFT BREAST: ICD-10-CM

## 2023-02-16 PROCEDURE — G0279 TOMOSYNTHESIS, MAMMO: HCPCS

## 2023-02-16 PROCEDURE — 77065 DX MAMMO INCL CAD UNI: CPT

## 2023-07-10 ENCOUNTER — HOSPITAL ENCOUNTER (OUTPATIENT)
Dept: WOMENS IMAGING | Age: 73
Discharge: HOME | End: 2023-07-12
Payer: MEDICARE

## 2023-07-10 DIAGNOSIS — D05.12 DUCTAL CARCINOMA IN SITU (DCIS) OF LEFT BREAST: ICD-10-CM

## 2023-07-10 PROCEDURE — G0279 TOMOSYNTHESIS, MAMMO: HCPCS

## 2024-07-22 ENCOUNTER — HOSPITAL ENCOUNTER (OUTPATIENT)
Dept: WOMENS IMAGING | Age: 74
Discharge: HOME | End: 2024-07-24
Payer: MEDICARE

## 2024-07-22 VITALS — WEIGHT: 219 LBS | HEIGHT: 65 IN | BODY MASS INDEX: 36.49 KG/M2

## 2024-07-22 DIAGNOSIS — C50.912 MALIGNANT NEOPLASM OF LEFT FEMALE BREAST, UNSPECIFIED ESTROGEN RECEPTOR STATUS, UNSPECIFIED SITE OF BREAST (HCC): ICD-10-CM

## 2024-07-22 DIAGNOSIS — Z12.31 ENCOUNTER FOR SCREENING MAMMOGRAM FOR MALIGNANT NEOPLASM OF BREAST: ICD-10-CM

## 2024-07-22 DIAGNOSIS — R92.30 DENSE BREASTS: ICD-10-CM

## 2024-07-22 PROCEDURE — 77063 BREAST TOMOSYNTHESIS BI: CPT

## 2025-05-24 ENCOUNTER — APPOINTMENT (OUTPATIENT)
Dept: GENERAL RADIOLOGY | Age: 75
End: 2025-05-24
Payer: MEDICARE

## 2025-05-24 ENCOUNTER — HOSPITAL ENCOUNTER (EMERGENCY)
Age: 75
Discharge: HOME | End: 2025-05-24
Attending: EMERGENCY MEDICINE
Payer: MEDICARE

## 2025-05-24 VITALS
DIASTOLIC BLOOD PRESSURE: 91 MMHG | TEMPERATURE: 98 F | HEART RATE: 75 BPM | HEIGHT: 66 IN | BODY MASS INDEX: 33.43 KG/M2 | WEIGHT: 208 LBS | RESPIRATION RATE: 15 BRPM | OXYGEN SATURATION: 96 % | SYSTOLIC BLOOD PRESSURE: 158 MMHG

## 2025-05-24 DIAGNOSIS — M25.562 LEFT KNEE PAIN, UNSPECIFIED CHRONICITY: Primary | ICD-10-CM

## 2025-05-24 PROCEDURE — 99284 EMERGENCY DEPT VISIT MOD MDM: CPT

## 2025-05-24 PROCEDURE — 96372 THER/PROPH/DIAG INJ SC/IM: CPT

## 2025-05-24 PROCEDURE — 6360000002 HC RX W HCPCS: Performed by: EMERGENCY MEDICINE

## 2025-05-24 PROCEDURE — 73562 X-RAY EXAM OF KNEE 3: CPT

## 2025-05-24 RX ORDER — IBUPROFEN 600 MG/1
600 TABLET, FILM COATED ORAL EVERY 8 HOURS PRN
Qty: 15 TABLET | Refills: 0 | Status: SHIPPED | OUTPATIENT
Start: 2025-05-24

## 2025-05-24 RX ORDER — KETOROLAC TROMETHAMINE 30 MG/ML
30 INJECTION, SOLUTION INTRAMUSCULAR; INTRAVENOUS ONCE
Status: COMPLETED | OUTPATIENT
Start: 2025-05-24 | End: 2025-05-24

## 2025-05-24 RX ADMIN — KETOROLAC TROMETHAMINE 30 MG: 30 INJECTION, SOLUTION INTRAMUSCULAR at 16:50

## 2025-05-24 ASSESSMENT — LIFESTYLE VARIABLES
HOW OFTEN DO YOU HAVE A DRINK CONTAINING ALCOHOL: NEVER
HOW MANY STANDARD DRINKS CONTAINING ALCOHOL DO YOU HAVE ON A TYPICAL DAY: PATIENT DOES NOT DRINK

## 2025-05-24 ASSESSMENT — PAIN DESCRIPTION - DESCRIPTORS: DESCRIPTORS: SHARP

## 2025-05-24 ASSESSMENT — PAIN DESCRIPTION - ORIENTATION: ORIENTATION: LEFT

## 2025-05-24 ASSESSMENT — PAIN DESCRIPTION - LOCATION: LOCATION: KNEE

## 2025-05-24 ASSESSMENT — PAIN - FUNCTIONAL ASSESSMENT: PAIN_FUNCTIONAL_ASSESSMENT: 0-10

## 2025-05-24 ASSESSMENT — PAIN SCALES - GENERAL: PAINLEVEL_OUTOF10: 9

## 2025-05-24 ASSESSMENT — ENCOUNTER SYMPTOMS: COLOR CHANGE: 0

## 2025-05-24 NOTE — ED PROVIDER NOTES
indicated that her father is . She indicated that both of her brothers are alive.     SOCIAL HISTORY       Social History     Tobacco Use    Smoking status: Former     Current packs/day: 0.00     Average packs/day: 0.5 packs/day for 47.9 years (23.9 ttl pk-yrs)     Types: Cigarettes     Start date: 1969     Quit date: 2017     Years since quittin.7    Smokeless tobacco: Never    Tobacco comments:     Smoked very, very rare.   Substance Use Topics    Alcohol use: Yes     Comment: Social- not weekly    Drug use: No     PHYSICAL EXAM     INITIAL VITALS: BP (!) 158/91   Pulse 75   Temp 98 °F (36.7 °C) (Oral)   Resp 15   Ht 1.676 m (5' 6\")   Wt 94.3 kg (208 lb)   SpO2 96%   BMI 33.57 kg/m²    Physical Exam  Vitals and nursing note reviewed.   Constitutional:       General: She is not in acute distress.     Appearance: She is not ill-appearing.   HENT:      Head: Normocephalic.      Right Ear: External ear normal.      Left Ear: External ear normal.      Mouth/Throat:      Mouth: Mucous membranes are moist.   Eyes:      Extraocular Movements: Extraocular movements intact.   Cardiovascular:      Rate and Rhythm: Normal rate and regular rhythm.      Pulses:           Radial pulses are 2+ on the right side and 2+ on the left side.        Dorsalis pedis pulses are 2+ on the right side and 2+ on the left side.        Posterior tibial pulses are 2+ on the right side and 2+ on the left side.      Heart sounds: Normal heart sounds.   Pulmonary:      Effort: Pulmonary effort is normal. No respiratory distress.   Abdominal:      Palpations: Abdomen is soft.   Musculoskeletal:      Right knee: Tenderness present over the lateral joint line.   Skin:     General: Skin is warm.   Neurological:      Mental Status: She is alert.         MEDICAL DECISION MAKING / ED COURSE:         1)  Number and Complexity of Problems Addressed at this Encounter  Problem List This Visit: Knee pain    Differential Diagnosis:

## 2025-05-24 NOTE — ED TRIAGE NOTES
Mode of arrival (squad #, walk in, police, etc) : walk in / cane        Chief complaint(s): L knee pain        Arrival Note (brief scenario, treatment PTA, etc).: Patient reports twisting her L knee couple of days ago, heard a pop while walking yesterday and pain has been worsening.        C= \"Have you ever felt that you should Cut down on your drinking?\"  No  A= \"Have people Annoyed you by criticizing your drinking?\"  No  G= \"Have you ever felt bad or Guilty about your drinking?\"  No  E= \"Have you ever had a drink as an Eye-opener first thing in the morning to steady your nerves or to help a hangover?\"  No      Deferred []      Reason for deferring: N/A    *If yes to two or more: probable alcohol abuse.*

## 2025-05-29 ENCOUNTER — OFFICE VISIT (OUTPATIENT)
Dept: ORTHOPEDIC SURGERY | Age: 75
End: 2025-05-29
Payer: MEDICARE

## 2025-05-29 VITALS — WEIGHT: 218.4 LBS | BODY MASS INDEX: 35.1 KG/M2 | RESPIRATION RATE: 14 BRPM | HEIGHT: 66 IN

## 2025-05-29 DIAGNOSIS — M25.562 MEDIAL KNEE PAIN, LEFT: ICD-10-CM

## 2025-05-29 DIAGNOSIS — M25.362 KNEE BUCKLING, LEFT: ICD-10-CM

## 2025-05-29 DIAGNOSIS — M25.562 ACUTE PAIN OF LEFT KNEE: Primary | ICD-10-CM

## 2025-05-29 PROCEDURE — G8427 DOCREV CUR MEDS BY ELIG CLIN: HCPCS | Performed by: PHYSICIAN ASSISTANT

## 2025-05-29 PROCEDURE — 99203 OFFICE O/P NEW LOW 30 MIN: CPT | Performed by: PHYSICIAN ASSISTANT

## 2025-05-29 PROCEDURE — G8400 PT W/DXA NO RESULTS DOC: HCPCS | Performed by: PHYSICIAN ASSISTANT

## 2025-05-29 PROCEDURE — 1090F PRES/ABSN URINE INCON ASSESS: CPT | Performed by: PHYSICIAN ASSISTANT

## 2025-05-29 PROCEDURE — 1159F MED LIST DOCD IN RCRD: CPT | Performed by: PHYSICIAN ASSISTANT

## 2025-05-29 PROCEDURE — 1125F AMNT PAIN NOTED PAIN PRSNT: CPT | Performed by: PHYSICIAN ASSISTANT

## 2025-05-29 PROCEDURE — 1036F TOBACCO NON-USER: CPT | Performed by: PHYSICIAN ASSISTANT

## 2025-05-29 PROCEDURE — G8417 CALC BMI ABV UP PARAM F/U: HCPCS | Performed by: PHYSICIAN ASSISTANT

## 2025-05-29 PROCEDURE — 1123F ACP DISCUSS/DSCN MKR DOCD: CPT | Performed by: PHYSICIAN ASSISTANT

## 2025-05-29 ASSESSMENT — ENCOUNTER SYMPTOMS
COUGH: 0
SHORTNESS OF BREATH: 0
VOMITING: 0
COLOR CHANGE: 0

## 2025-05-29 NOTE — PROGRESS NOTES
[facility] PHYSICIANS [facility] ORTHOPEDICS  [address] [place] [address]  Dept: [phone]    Ambulatory Orthopedic New Patient Visit      CHIEF COMPLAINT:    Chief Complaint   Patient presents with    Knee Pain     Left Knee Pain       HISTORY OF PRESENT ILLNESS:      History of Present Illness  The patient is a 75-year-old female here today for a new patient appointment regarding acute left knee pain.    She experienced two separate twisting incidents, one on 05/20/2025 and another on 05/24/2025, which caused increased pain in her left knee. The first incident occurred while she was performing household chores, during which she inadvertently twisted her leg. The second incident happened as she was descending a flight of three steps, resulting in an immediate onset of pain. Despite the pain, she did not fall or hit her knee. She sought medical attention at [facility] emergency department on 05/24/2025, where x-rays of the left knee were obtained, revealing no acute fracture or osseous abnormality. Her knee was wrapped, providing some relief, but the discomfort persists.    She has been managing the pain with ibuprofen and hydrocodone, the latter of which she also uses for back pain. She reports intermittent improvement in her condition. She applied ice to the affected area for 72 hours and has since been using heat therapy. She relies on a cane for mobility and alternates between ibuprofen and hydrocodone for pain management.          Past Medical History:        Past Medical History:   Diagnosis Date    Abnormal EKG     Anesthesia     hallucinated post op once, unsure why?    Chronic renal disease, stage III (HCC) [382640] 06/20/2022    Colon polyp 07/22/2010    High grade dysplasia, Removed complete, clear margins    CTS (carpal tunnel syndrome)     B/l    Diverticulitis     Ductal carcinoma in situ (DCIS) of left breast     Goran

## 2025-06-02 ENCOUNTER — HOSPITAL ENCOUNTER (OUTPATIENT)
Dept: MRI IMAGING | Age: 75
Discharge: HOME | End: 2025-06-04
Payer: MEDICARE

## 2025-06-02 DIAGNOSIS — M25.562 MEDIAL KNEE PAIN, LEFT: ICD-10-CM

## 2025-06-02 DIAGNOSIS — M25.362 KNEE BUCKLING, LEFT: ICD-10-CM

## 2025-06-02 DIAGNOSIS — M25.562 ACUTE PAIN OF LEFT KNEE: ICD-10-CM

## 2025-06-02 PROCEDURE — 73721 MRI JNT OF LWR EXTRE W/O DYE: CPT

## 2025-06-03 ENCOUNTER — RESULTS FOLLOW-UP (OUTPATIENT)
Dept: ORTHOPEDIC SURGERY | Age: 75
End: 2025-06-03

## 2025-06-03 NOTE — RESULT ENCOUNTER NOTE
Please call the patient and make her aware that she does have complex tear of the medial meniscus and moderate to severe osteoarthritis within the knee.  I do recommend patient follows up with Dr. Puentes to discuss MRI results and next steps in her treatment.
<--- Click to Launch ICDx for PreOp, PostOp and Procedure

## 2025-06-04 NOTE — TELEPHONE ENCOUNTER
Spoke with patient and gave her providers message regarding MRI results and recommendations.  Patient is scheduled with Dr. Puentes.

## 2025-06-04 NOTE — TELEPHONE ENCOUNTER
----- Message from Jordyn Plata PA-C sent at 6/3/2025 10:27 AM EDT -----  Please call the patient and make her aware that she does have complex tear of the medial meniscus and moderate to severe osteoarthritis within the knee.  I do recommend patient follows up with Dr. Puentes to discuss MRI results and next steps in her tr  eatment.

## 2025-06-18 ENCOUNTER — PREP FOR PROCEDURE (OUTPATIENT)
Dept: ORTHOPEDIC SURGERY | Age: 75
End: 2025-06-18

## 2025-06-18 ENCOUNTER — OFFICE VISIT (OUTPATIENT)
Dept: ORTHOPEDIC SURGERY | Age: 75
End: 2025-06-18
Payer: MEDICARE

## 2025-06-18 VITALS — BODY MASS INDEX: 33.43 KG/M2 | WEIGHT: 208 LBS | RESPIRATION RATE: 18 BRPM | HEIGHT: 66 IN

## 2025-06-18 DIAGNOSIS — M25.562 LEFT KNEE PAIN, UNSPECIFIED CHRONICITY: Primary | ICD-10-CM

## 2025-06-18 PROBLEM — S83.242A TEAR OF MEDIAL MENISCUS OF LEFT KNEE, CURRENT: Status: ACTIVE | Noted: 2025-06-18

## 2025-06-18 PROCEDURE — G8417 CALC BMI ABV UP PARAM F/U: HCPCS | Performed by: ORTHOPAEDIC SURGERY

## 2025-06-18 PROCEDURE — 1090F PRES/ABSN URINE INCON ASSESS: CPT | Performed by: ORTHOPAEDIC SURGERY

## 2025-06-18 PROCEDURE — 99213 OFFICE O/P EST LOW 20 MIN: CPT | Performed by: ORTHOPAEDIC SURGERY

## 2025-06-18 PROCEDURE — G8400 PT W/DXA NO RESULTS DOC: HCPCS | Performed by: ORTHOPAEDIC SURGERY

## 2025-06-18 PROCEDURE — 1123F ACP DISCUSS/DSCN MKR DOCD: CPT | Performed by: ORTHOPAEDIC SURGERY

## 2025-06-18 PROCEDURE — G8427 DOCREV CUR MEDS BY ELIG CLIN: HCPCS | Performed by: ORTHOPAEDIC SURGERY

## 2025-06-18 PROCEDURE — 1036F TOBACCO NON-USER: CPT | Performed by: ORTHOPAEDIC SURGERY

## 2025-06-18 PROCEDURE — 1125F AMNT PAIN NOTED PAIN PRSNT: CPT | Performed by: ORTHOPAEDIC SURGERY

## 2025-06-18 PROCEDURE — 1159F MED LIST DOCD IN RCRD: CPT | Performed by: ORTHOPAEDIC SURGERY

## 2025-06-18 NOTE — PROGRESS NOTES
Current packs/day: 0.00     Average packs/day: 0.5 packs/day for 47.9 years (23.9 ttl pk-yrs)     Types: Cigarettes     Start date: 1969     Quit date: 2017     Years since quittin.8    Smokeless tobacco: Never    Tobacco comments:     Smoked very, very rare.   Substance and Sexual Activity    Alcohol use: Yes     Comment: Social- not weekly    Drug use: No    Sexual activity: Yes     Partners: Male   Other Topics Concern    Not on file   Social History Narrative    Not on file     Social Drivers of Health     Financial Resource Strain: Low Risk  (2024)    Overall Financial Resource Strain (CARDIA)     Difficulty of Paying Living Expenses: Not hard at all   Food Insecurity: No Food Insecurity (2025)    Hunger Vital Sign     Worried About Running Out of Food in the Last Year: Never true     Ran Out of Food in the Last Year: Never true   Transportation Needs: No Transportation Needs (2025)    PRAPARE - Transportation     Lack of Transportation (Medical): No     Lack of Transportation (Non-Medical): No   Physical Activity: Insufficiently Active (2024)    Exercise Vital Sign     Days of Exercise per Week: 4 days     Minutes of Exercise per Session: 20 min   Stress: Not on file   Social Connections: Not on file   Intimate Partner Violence: Not on file   Housing Stability: Low Risk  (2025)    Housing Stability Vital Sign     Unable to Pay for Housing in the Last Year: No     Number of Times Moved in the Last Year: 0     Homeless in the Last Year: No     Past Medical History:   Diagnosis Date    Abnormal EKG     Anesthesia     hallucinated post op once, unsure why?    Chronic renal disease, stage III (HCC) [553120] 2022    Colon polyp 2010    High grade dysplasia, Removed complete, clear margins    CTS (carpal tunnel syndrome)     B/l    Diverticulitis     Ductal carcinoma in situ (DCIS) of left breast     Roger's deformity 2013    left foot    Hyperlipidemia

## 2025-06-30 NOTE — H&P (VIEW-ONLY)
.    HISTORY and PHYSICAL  [facility]       NAME:  Mila Victor  MRN: [id number]   YOB: 1950   Date: 7/1/2025   Age: 75 y.o.  Gender: female     COMPLAINT AND PRESENT HISTORY:   Mila Victor is 75 y.o.,  female, presents for pre-anesthesia/admission testing for KNEE ARTHROSCOPIC PARTIAL MEDIAL MENISCECTOMY LEFT  per Dr. Puentes.  Primary dx: Other tear of medial meniscus of left knee as current injury, initial encounter [S83.242A]       Portion of Office Note per Dr Puentes on 6/18/2025  History of Present Illness:  This is a 75 y.o. female who presents to the clinic today for evaluation / follow up of for left knee pain.  Patient is a 75-year-old female who was originally seen by Jordyn after she sustained 2 near falls and the knee giving out on her.  She was actually seen in the emergency room where she did have x-rays which were unremarkable she did have some standing x-rays taken in Jordyn's office which shows she does some modest medial joint space narrowing but nothing too severe.  Her findings symptoms were compatible meniscus tear and she was placed in a brace and she is here to discuss results of the MRI which was performed on 6/2/2025.     Patient does state that when she twists or turns she does get a sharp stabbing pain on the inside of her knee and also feels that her knee wants to give out on her..     Labs and Imaging:   XR taken today: No new x-rays taken today previous x-rays show that some modest medial joint space narrowing but but nothing too severe that would indicate need for arthroplasty.     MRI of the patient's left knee performed on 6/2/2025    IMPRESSION:  1. Complex tearing and volume loss in the root ligament of the posterior horn  medial meniscus. Outward extrusion and degeneration of the posterior horn and  body of the medial meniscus.  2. Degeneration of the lateral meniscus. Slight outward subluxation of the  lateral meniscus body.  3. Probable grade 1 MCL

## 2025-06-30 NOTE — H&P
.    HISTORY and PHYSICAL  [facility]       NAME:  Mila Victor  MRN: [id number]   YOB: 1950   Date: 7/1/2025   Age: 75 y.o.  Gender: female     COMPLAINT AND PRESENT HISTORY:   Mila Victor is 75 y.o.,  female, presents for pre-anesthesia/admission testing for KNEE ARTHROSCOPIC PARTIAL MEDIAL MENISCECTOMY LEFT  per Dr. Puentes.  Primary dx: Other tear of medial meniscus of left knee as current injury, initial encounter [S83.242A]       Portion of Office Note per Dr Puentes on 6/18/2025  History of Present Illness:  This is a 75 y.o. female who presents to the clinic today for evaluation / follow up of for left knee pain.  Patient is a 75-year-old female who was originally seen by Jordyn after she sustained 2 near falls and the knee giving out on her.  She was actually seen in the emergency room where she did have x-rays which were unremarkable she did have some standing x-rays taken in Jordyn's office which shows she does some modest medial joint space narrowing but nothing too severe.  Her findings symptoms were compatible meniscus tear and she was placed in a brace and she is here to discuss results of the MRI which was performed on 6/2/2025.     Patient does state that when she twists or turns she does get a sharp stabbing pain on the inside of her knee and also feels that her knee wants to give out on her..     Labs and Imaging:   XR taken today: No new x-rays taken today previous x-rays show that some modest medial joint space narrowing but but nothing too severe that would indicate need for arthroplasty.     MRI of the patient's left knee performed on 6/2/2025    IMPRESSION:  1. Complex tearing and volume loss in the root ligament of the posterior horn  medial meniscus. Outward extrusion and degeneration of the posterior horn and  body of the medial meniscus.  2. Degeneration of the lateral meniscus. Slight outward subluxation of the  lateral meniscus body.  3. Probable grade 1 MCL

## 2025-07-01 ENCOUNTER — HOSPITAL ENCOUNTER (OUTPATIENT)
Dept: PREADMISSION TESTING | Age: 75
Discharge: HOME | End: 2025-07-05
Payer: MEDICARE

## 2025-07-01 VITALS
HEIGHT: 66 IN | HEART RATE: 68 BPM | BODY MASS INDEX: 33.43 KG/M2 | WEIGHT: 208 LBS | DIASTOLIC BLOOD PRESSURE: 73 MMHG | TEMPERATURE: 98.2 F | OXYGEN SATURATION: 93 % | SYSTOLIC BLOOD PRESSURE: 116 MMHG | RESPIRATION RATE: 20 BRPM

## 2025-07-01 LAB
ANION GAP SERPL CALCULATED.3IONS-SCNC: 10 MMOL/L (ref 9–16)
BASOPHILS # BLD: 0.03 K/UL (ref 0–0.2)
BASOPHILS NFR BLD: 1 % (ref 0–2)
BUN SERPL-MCNC: 16 MG/DL (ref 8–23)
CALCIUM SERPL-MCNC: 9.4 MG/DL (ref 8.6–10.4)
CHLORIDE SERPL-SCNC: 106 MMOL/L (ref 98–107)
CO2 SERPL-SCNC: 27 MMOL/L (ref 20–31)
CREAT SERPL-MCNC: 1 MG/DL (ref 0.7–1.2)
EKG ATRIAL RATE: 56 BPM
EKG P AXIS: 24 DEGREES
EKG P-R INTERVAL: 182 MS
EKG Q-T INTERVAL: 396 MS
EKG QRS DURATION: 80 MS
EKG QTC CALCULATION (BAZETT): 382 MS
EKG R AXIS: 0 DEGREES
EKG T AXIS: 26 DEGREES
EKG VENTRICULAR RATE: 56 BPM
EOSINOPHIL # BLD: 0.24 K/UL (ref 0–0.44)
EOSINOPHILS RELATIVE PERCENT: 5 % (ref 0–4)
ERYTHROCYTE [DISTWIDTH] IN BLOOD BY AUTOMATED COUNT: 12.9 % (ref 11.5–14.9)
GFR, ESTIMATED: 58 ML/MIN/1.73M2
GLUCOSE SERPL-MCNC: 123 MG/DL (ref 74–99)
HCT VFR BLD AUTO: 41.6 % (ref 36–46)
HGB BLD-MCNC: 14.4 G/DL (ref 12–16)
IMM GRANULOCYTES # BLD AUTO: <0.03 K/UL (ref 0–0.3)
IMM GRANULOCYTES NFR BLD: 0 %
LYMPHOCYTES NFR BLD: 0.82 K/UL (ref 1.1–3.7)
LYMPHOCYTES RELATIVE PERCENT: 18 % (ref 24–44)
MCH RBC QN AUTO: 31.2 PG (ref 26–34)
MCHC RBC AUTO-ENTMCNC: 34.6 G/DL (ref 31–37)
MCV RBC AUTO: 90 FL (ref 80–100)
MONOCYTES NFR BLD: 0.52 K/UL (ref 0.1–1.2)
MONOCYTES NFR BLD: 11 % (ref 3–12)
NEUTROPHILS NFR BLD: 65 % (ref 36–66)
NEUTS SEG NFR BLD: 3.05 K/UL (ref 1.5–8.1)
NRBC BLD-RTO: 0 PER 100 WBC
PLATELET # BLD AUTO: 148 K/UL (ref 150–450)
PMV BLD AUTO: 11.1 FL (ref 8–13.5)
POTASSIUM SERPL-SCNC: 4.1 MMOL/L (ref 3.7–5.3)
RBC # BLD AUTO: 4.62 M/UL (ref 3.95–5.11)
SODIUM SERPL-SCNC: 143 MMOL/L (ref 136–145)
WBC OTHER # BLD: 4.7 K/UL (ref 3.5–11)

## 2025-07-01 PROCEDURE — 36415 COLL VENOUS BLD VENIPUNCTURE: CPT

## 2025-07-01 PROCEDURE — 93005 ELECTROCARDIOGRAM TRACING: CPT | Performed by: ANESTHESIOLOGY

## 2025-07-01 PROCEDURE — 80048 BASIC METABOLIC PNL TOTAL CA: CPT

## 2025-07-01 PROCEDURE — 85025 COMPLETE CBC W/AUTO DIFF WBC: CPT

## 2025-07-01 PROCEDURE — APPSS45 APP SPLIT SHARED TIME 31-45 MINUTES: Performed by: NURSE PRACTITIONER

## 2025-07-01 ASSESSMENT — ENCOUNTER SYMPTOMS
ABDOMINAL PAIN: 0
SHORTNESS OF BREATH: 0
DIARRHEA: 0
GASTROINTESTINAL NEGATIVE: 1
TROUBLE SWALLOWING: 0
VOMITING: 0
CONSTIPATION: 0
NAUSEA: 0
SORE THROAT: 0
APNEA: 0
COUGH: 0

## 2025-07-01 NOTE — DISCHARGE INSTRUCTIONS
Pre-op Instructions For Out-Patient Surgery    Medication Instructions:  Please stop herbs and any supplements now (includes vitamins and minerals).    For these medications:  Dulaglutide (Trulicity), Exenatide (Byetta and Bydureon, Liraglutide (Victoza), Lixisenatide (Adlyxin), Semaglutide (Ozempic and Rybelsus), Tirzepatide (Mounjaro, Zepbound, Wegovy)- Stop 1 week prior if taking weekly or 1 day prior if taking every 12 hours or daily. N/A    Please contact your surgeon and prescribing physician for pre-op instructions for any blood thinners. Ibuprofen     If you have inhalers/aerosol treatments at home, please use them the morning of your surgery and bring the inhalers with you to the hospital.    Please take the following medications the morning of your surgery with a sip of water:    Carvedilol and Amlodipine      Surgery Instructions:  After midnight before surgery:  Do not eat or drink anything, including water, mints, gum, and hard candy.  You may brush your teeth without swallowing.  No smoking, chewing tobacco, or street drugs.    Please shower or bathe before surgery.  If you were given Surgical Scrub Chlorhexidine Gluconate Liquid (CHG), please shower the night before and the morning of your surgery following the detailed instructions you received during your pre-admission visit.     Please do not wear any cologne, lotion, powder, deodorant, jewelry, piercings, perfume, makeup, nail polish, hair accessories, or hair spray on the day of surgery.  Wear loose comfortable clothing.    Leave your valuables at home but bring a payment source for any after-surgery prescriptions you plan to fill at [facility].  Bring a storage case for any glasses/contacts.    An adult who is responsible for you MUST remain the in the hospital and drive you home and should be with you for the first 24 hours after surgery.     If having out-patient knee and foot surgeries, please arrange

## 2025-07-14 ENCOUNTER — ANESTHESIA EVENT (OUTPATIENT)
Dept: OPERATING ROOM | Age: 75
End: 2025-07-14
Payer: MEDICARE

## 2025-07-14 NOTE — PRE-PROCEDURE INSTRUCTIONS
Nothing to eat after midnight.yes  Are you taking any blood thinners? When was the last day?n/a  Make sure to use Hibiclens prior to surgery.yes  Remove any jewelry and body piercings.yes  Do you wear glasses? If so, please bring a case to store them in.  Are you having any Covid symptoms?no  Do you have any new rashes, infections, etc. that we should be aware of?no  Do you have a ride home the day of surgery? It cannot be a cab or medical transportation.yes  Verify surgery time and what time to arrive at hospital. [id number]/[id number]

## 2025-07-15 ENCOUNTER — HOSPITAL ENCOUNTER (OUTPATIENT)
Age: 75
Setting detail: OUTPATIENT SURGERY
Discharge: HOME | End: 2025-07-15
Attending: ORTHOPAEDIC SURGERY | Admitting: ORTHOPAEDIC SURGERY
Payer: MEDICARE

## 2025-07-15 ENCOUNTER — ANESTHESIA (OUTPATIENT)
Dept: OPERATING ROOM | Age: 75
End: 2025-07-15
Payer: MEDICARE

## 2025-07-15 VITALS
DIASTOLIC BLOOD PRESSURE: 81 MMHG | HEART RATE: 73 BPM | WEIGHT: 208 LBS | BODY MASS INDEX: 33.43 KG/M2 | OXYGEN SATURATION: 96 % | HEIGHT: 66 IN | TEMPERATURE: 97.7 F | RESPIRATION RATE: 18 BRPM | SYSTOLIC BLOOD PRESSURE: 148 MMHG

## 2025-07-15 DIAGNOSIS — S83.232A COMPLEX TEAR OF MEDIAL MENISCUS OF LEFT KNEE AS CURRENT INJURY, INITIAL ENCOUNTER: Primary | ICD-10-CM

## 2025-07-15 PROCEDURE — 7100000010 HC PHASE II RECOVERY - FIRST 15 MIN: Performed by: ORTHOPAEDIC SURGERY

## 2025-07-15 PROCEDURE — 7100000031 HC ASPR PHASE II RECOVERY - ADDTL 15 MIN: Performed by: ORTHOPAEDIC SURGERY

## 2025-07-15 PROCEDURE — 2580000003 HC RX 258: Performed by: ANESTHESIOLOGY

## 2025-07-15 PROCEDURE — 6360000002 HC RX W HCPCS: Performed by: ORTHOPAEDIC SURGERY

## 2025-07-15 PROCEDURE — 7100000011 HC PHASE II RECOVERY - ADDTL 15 MIN: Performed by: ORTHOPAEDIC SURGERY

## 2025-07-15 PROCEDURE — 2709999900 HC NON-CHARGEABLE SUPPLY: Performed by: ORTHOPAEDIC SURGERY

## 2025-07-15 PROCEDURE — 3600000003 HC SURGERY LEVEL 3 BASE: Performed by: ORTHOPAEDIC SURGERY

## 2025-07-15 PROCEDURE — 3700000000 HC ANESTHESIA ATTENDED CARE: Performed by: ORTHOPAEDIC SURGERY

## 2025-07-15 PROCEDURE — 7100000000 HC PACU RECOVERY - FIRST 15 MIN: Performed by: ORTHOPAEDIC SURGERY

## 2025-07-15 PROCEDURE — 7100000001 HC PACU RECOVERY - ADDTL 15 MIN: Performed by: ORTHOPAEDIC SURGERY

## 2025-07-15 PROCEDURE — 3700000001 HC ADD 15 MINUTES (ANESTHESIA): Performed by: ORTHOPAEDIC SURGERY

## 2025-07-15 PROCEDURE — 7100000030 HC ASPR PHASE II RECOVERY - FIRST 15 MIN: Performed by: ORTHOPAEDIC SURGERY

## 2025-07-15 PROCEDURE — 6360000002 HC RX W HCPCS: Performed by: ANESTHESIOLOGY

## 2025-07-15 PROCEDURE — 6370000000 HC RX 637 (ALT 250 FOR IP): Performed by: ANESTHESIOLOGY

## 2025-07-15 PROCEDURE — 6360000002 HC RX W HCPCS: Performed by: NURSE ANESTHETIST, CERTIFIED REGISTERED

## 2025-07-15 PROCEDURE — 3600000013 HC SURGERY LEVEL 3 ADDTL 15MIN: Performed by: ORTHOPAEDIC SURGERY

## 2025-07-15 RX ORDER — LIDOCAINE HYDROCHLORIDE 10 MG/ML
1 INJECTION, SOLUTION EPIDURAL; INFILTRATION; INTRACAUDAL; PERINEURAL
Status: COMPLETED | OUTPATIENT
Start: 2025-07-15 | End: 2025-07-15

## 2025-07-15 RX ORDER — SODIUM CHLORIDE 0.9 % (FLUSH) 0.9 %
5-40 SYRINGE (ML) INJECTION PRN
Status: DISCONTINUED | OUTPATIENT
Start: 2025-07-15 | End: 2025-07-15 | Stop reason: HOSPADM

## 2025-07-15 RX ORDER — ONDANSETRON 2 MG/ML
4 INJECTION INTRAMUSCULAR; INTRAVENOUS
Status: DISCONTINUED | OUTPATIENT
Start: 2025-07-15 | End: 2025-07-15 | Stop reason: HOSPADM

## 2025-07-15 RX ORDER — FENTANYL CITRATE 0.05 MG/ML
25 INJECTION, SOLUTION INTRAMUSCULAR; INTRAVENOUS EVERY 5 MIN PRN
Status: DISCONTINUED | OUTPATIENT
Start: 2025-07-15 | End: 2025-07-15 | Stop reason: HOSPADM

## 2025-07-15 RX ORDER — SODIUM CHLORIDE, SODIUM LACTATE, POTASSIUM CHLORIDE, CALCIUM CHLORIDE 600; 310; 30; 20 MG/100ML; MG/100ML; MG/100ML; MG/100ML
INJECTION, SOLUTION INTRAVENOUS CONTINUOUS
Status: DISCONTINUED | OUTPATIENT
Start: 2025-07-15 | End: 2025-07-15 | Stop reason: HOSPADM

## 2025-07-15 RX ORDER — LIDOCAINE HYDROCHLORIDE 10 MG/ML
INJECTION, SOLUTION EPIDURAL; INFILTRATION; INTRACAUDAL; PERINEURAL
Status: DISCONTINUED | OUTPATIENT
Start: 2025-07-15 | End: 2025-07-15 | Stop reason: SDUPTHER

## 2025-07-15 RX ORDER — ROPIVACAINE HYDROCHLORIDE 5 MG/ML
INJECTION, SOLUTION EPIDURAL; INFILTRATION; PERINEURAL PRN
Status: DISCONTINUED | OUTPATIENT
Start: 2025-07-15 | End: 2025-07-15 | Stop reason: ALTCHOICE

## 2025-07-15 RX ORDER — DIPHENHYDRAMINE HYDROCHLORIDE 50 MG/ML
12.5 INJECTION, SOLUTION INTRAMUSCULAR; INTRAVENOUS
Status: DISCONTINUED | OUTPATIENT
Start: 2025-07-15 | End: 2025-07-15 | Stop reason: HOSPADM

## 2025-07-15 RX ORDER — SODIUM CHLORIDE 9 MG/ML
INJECTION, SOLUTION INTRAVENOUS PRN
Status: DISCONTINUED | OUTPATIENT
Start: 2025-07-15 | End: 2025-07-15 | Stop reason: HOSPADM

## 2025-07-15 RX ORDER — GABAPENTIN 100 MG/1
100 CAPSULE ORAL ONCE
Status: COMPLETED | OUTPATIENT
Start: 2025-07-15 | End: 2025-07-15

## 2025-07-15 RX ORDER — SODIUM CHLORIDE 0.9 % (FLUSH) 0.9 %
5-40 SYRINGE (ML) INJECTION EVERY 12 HOURS SCHEDULED
Status: DISCONTINUED | OUTPATIENT
Start: 2025-07-15 | End: 2025-07-15 | Stop reason: HOSPADM

## 2025-07-15 RX ORDER — PROPOFOL 10 MG/ML
INJECTION, EMULSION INTRAVENOUS
Status: DISCONTINUED | OUTPATIENT
Start: 2025-07-15 | End: 2025-07-15 | Stop reason: SDUPTHER

## 2025-07-15 RX ORDER — HYDROCODONE BITARTRATE AND ACETAMINOPHEN 5; 325 MG/1; MG/1
1 TABLET ORAL EVERY 4 HOURS PRN
Qty: 20 TABLET | Refills: 0 | Status: SHIPPED | OUTPATIENT
Start: 2025-07-15 | End: 2025-07-20

## 2025-07-15 RX ORDER — FENTANYL CITRATE 50 UG/ML
INJECTION, SOLUTION INTRAMUSCULAR; INTRAVENOUS
Status: DISCONTINUED | OUTPATIENT
Start: 2025-07-15 | End: 2025-07-15 | Stop reason: SDUPTHER

## 2025-07-15 RX ORDER — ONDANSETRON 2 MG/ML
INJECTION INTRAMUSCULAR; INTRAVENOUS
Status: DISCONTINUED | OUTPATIENT
Start: 2025-07-15 | End: 2025-07-15 | Stop reason: SDUPTHER

## 2025-07-15 RX ORDER — HYDROCODONE BITARTRATE AND ACETAMINOPHEN 5; 325 MG/1; MG/1
1 TABLET ORAL
Status: DISCONTINUED | OUTPATIENT
Start: 2025-07-15 | End: 2025-07-15 | Stop reason: HOSPADM

## 2025-07-15 RX ORDER — ACETAMINOPHEN 500 MG
1000 TABLET ORAL ONCE
Status: COMPLETED | OUTPATIENT
Start: 2025-07-15 | End: 2025-07-15

## 2025-07-15 RX ADMIN — ONDANSETRON 4 MG: 2 INJECTION, SOLUTION INTRAMUSCULAR; INTRAVENOUS at 13:18

## 2025-07-15 RX ADMIN — FENTANYL CITRATE 50 MCG: 50 INJECTION INTRAMUSCULAR; INTRAVENOUS at 13:35

## 2025-07-15 RX ADMIN — SODIUM CHLORIDE, SODIUM LACTATE, POTASSIUM CHLORIDE, AND CALCIUM CHLORIDE: .6; .31; .03; .02 INJECTION, SOLUTION INTRAVENOUS at 11:26

## 2025-07-15 RX ADMIN — HYDROMORPHONE HYDROCHLORIDE 0.25 MG: 1 INJECTION, SOLUTION INTRAMUSCULAR; INTRAVENOUS; SUBCUTANEOUS at 14:14

## 2025-07-15 RX ADMIN — FENTANYL CITRATE 50 MCG: 50 INJECTION INTRAMUSCULAR; INTRAVENOUS at 13:36

## 2025-07-15 RX ADMIN — FENTANYL CITRATE 25 MCG: 50 INJECTION INTRAMUSCULAR; INTRAVENOUS at 13:18

## 2025-07-15 RX ADMIN — ACETAMINOPHEN 1000 MG: 500 TABLET ORAL at 11:28

## 2025-07-15 RX ADMIN — FENTANYL CITRATE 25 MCG: 50 INJECTION INTRAMUSCULAR; INTRAVENOUS at 13:26

## 2025-07-15 RX ADMIN — HYDROMORPHONE HYDROCHLORIDE 0.25 MG: 1 INJECTION, SOLUTION INTRAMUSCULAR; INTRAVENOUS; SUBCUTANEOUS at 14:25

## 2025-07-15 RX ADMIN — GABAPENTIN 100 MG: 100 CAPSULE ORAL at 11:28

## 2025-07-15 RX ADMIN — PROPOFOL 180 MG: 10 INJECTION, EMULSION INTRAVENOUS at 13:18

## 2025-07-15 RX ADMIN — LIDOCAINE HYDROCHLORIDE 1 ML: 10 INJECTION, SOLUTION EPIDURAL; INFILTRATION; INTRACAUDAL; PERINEURAL at 11:25

## 2025-07-15 RX ADMIN — FENTANYL CITRATE 50 MCG: 50 INJECTION INTRAMUSCULAR; INTRAVENOUS at 13:33

## 2025-07-15 RX ADMIN — LIDOCAINE HYDROCHLORIDE 40 MG: 10 INJECTION, SOLUTION EPIDURAL; INFILTRATION; INTRACAUDAL; PERINEURAL at 13:18

## 2025-07-15 ASSESSMENT — PAIN SCALES - GENERAL
PAINLEVEL_OUTOF10: 6
PAINLEVEL_OUTOF10: 5
PAINLEVEL_OUTOF10: 5

## 2025-07-15 ASSESSMENT — PAIN DESCRIPTION - FREQUENCY: FREQUENCY: CONTINUOUS

## 2025-07-15 ASSESSMENT — PAIN DESCRIPTION - ONSET: ONSET: AWAKENED FROM SLEEP

## 2025-07-15 ASSESSMENT — PAIN DESCRIPTION - ORIENTATION: ORIENTATION: LEFT

## 2025-07-15 ASSESSMENT — PAIN DESCRIPTION - PAIN TYPE: TYPE: SURGICAL PAIN

## 2025-07-15 ASSESSMENT — PAIN - FUNCTIONAL ASSESSMENT: PAIN_FUNCTIONAL_ASSESSMENT: 0-10

## 2025-07-15 ASSESSMENT — PAIN DESCRIPTION - LOCATION: LOCATION: KNEE

## 2025-07-15 NOTE — DISCHARGE INSTRUCTIONS
DISCHARGE INSTRUCTIONS FOR GENERAL ANESTHESIA    In order to continue your care at home, please follow the instructions below.    Anesthesia - Do not drink any alcoholic beverages or make any legal or important decisions for 24 hours. If your surgery was on an extremity or abdomen, do not drive or operate any machinery until your surgeon approves, otherwise do not drive or operate any machinery for 24 hours or as restricted by your surgeon.     Pain Medications - Please do not drive, operate machinery, or drink alcoholic beverages while taking any prescribed pain medication.     Diet -  Start out eating lightly (broth, soup, crackers, toast, etc.) advancing as tolerated to your usual diet.  Try to avoid spicy and greasy/fatty foods for 24 hours. Drink plenty of fluids after surgery, unless you are on a fluid restriction.  Avoid milk/milk product for several hours.          [facility]   Dr. Gregory Puentes M.D.  [phone]  POST OPERATIVE DISCHARGE INSTRUCTIONS   KNEE ARTHROSCOPY     Follow-up in office seven to ten days after surgery.  Call for appointment if not already made. ([phone]).    Take pain medication as ordered.    Keep the dressing/ace wrap on for 72 hours (3 days).  After the 72 hours, may remove ace wrap and dressing, place Band-Aids over sutures and then if desired reapply ace wrap.  Start wrapping at the ankle and wrap up to the top of the leg.    You may shower, but keep the dressing & wounds dry with plastic bag around knee/leg until seen by Dr. Puentes.    After surgery, it is weight bearing as tolerated, unless instructed differently by Dr. Puentes after surgery.  Use crutches or a walker as needed based on how your knee feels.    Be sure to keep your leg elevated when sitting or lying down.  Use 2-3 pillows under leg.    Ice to surgical site for 20 to 30 minutes four times a day for 48 hours.    Dr. Puentes recommends taking one Aspirin 325 mg daily for 7 days after surgery

## 2025-07-15 NOTE — ANESTHESIA PRE PROCEDURE
Department of Anesthesiology  Preprocedure Note       Name:  Mila Victor   Age:  75 y.o.  :  1950                                          MRN:  [id number]         Date:  7/15/2025      Surgeon: Surgeon(s):  Gregory Puentes MD    Procedure: Procedure(s):  KNEE ARTHROSCOPIC PARTIAL MEDIAL MENISCECTOMY LEFT    Medications prior to admission:   Prior to Admission medications    Medication Sig Start Date End Date Taking? Authorizing Provider   HYDROcodone-acetaminophen (NORCO) 5-325 MG per tablet Take 1 tablet by mouth every 4 hours as needed for Pain for up to 5 days. Intended supply: 5 days. Take lowest dose possible to manage pain Max Daily Amount: 6 tablets 7/15/25 7/20/25 Yes Gregory Puentes MD   carvedilol (COREG) 25 MG tablet TAKE 1 TABLET BY MOUTH TWICE  DAILY 25  Yes Alcides Garcia MD   ibuprofen (IBU) 600 MG tablet Take 1 tablet by mouth every 8 hours as needed for Pain 25  Yes Fe Miramontes DO   losartan (COZAAR) 100 MG tablet TAKE 1 TABLET BY MOUTH DAILY 25  Yes Alcides Garcia MD   atorvastatin (LIPITOR) 20 MG tablet TAKE 1 TABLET BY MOUTH DAILY 24  Yes Alcides Garcia MD   amLODIPine (NORVASC) 5 MG tablet TAKE 1 TABLET BY MOUTH DAILY 24  Yes Alcides Garcia MD   vitamin D 25 MCG (1000 UT) CAPS Take by mouth   Yes Seamus Tenorio MD   tiZANidine (ZANAFLEX) 4 MG tablet Take 1 tablet by mouth 2 times daily as needed (Muscle spasm) 23  Yes Alcides Garcia MD   anastrozole (ARIMIDEX) 1 MG tablet  1/3/23  Yes Seamus Tenorio MD   HYDROcodone-acetaminophen (NORCO) 7.5-325 MG per tablet TAKE 1 TABLET BY MOUTH TWICE A DAY AS NEEDED 19  Yes Seamus Tenorio MD   azelastine (OPTIVAR) 0.05 % ophthalmic solution Place 1 drop into both eyes 2 times daily  Patient not taking: Reported on 2025   Alcides Garcia MD   Calcium-Magnesium-Vitamin D (CALCIUM 1200+D3 PO) Take by mouth  Patient not

## 2025-07-15 NOTE — ANESTHESIA POSTPROCEDURE EVALUATION
Department of Anesthesiology  Postprocedure Note    Patient: Mila Victor  MRN: [id number]  YOB: 1950  Date of evaluation: 7/15/2025    Procedure Summary       Date: 07/15/25 Room / Location: [address]    Anesthesia Start: 1312 Anesthesia Stop: 1356    Procedure: KNEE ARTHROSCOPIC PARTIAL MEDIAL MENISCECTOMY LEFT (Left: Knee) Diagnosis:       Other tear of medial meniscus of left knee as current injury, initial encounter      (Other tear of medial meniscus of left knee as current injury, initial encounter [S83.242A])    Surgeons: Gregory Puentes MD Responsible Provider: Yajaira Alvarez MD    Anesthesia Type: general ASA Status: 3            Anesthesia Type: No value filed.    Lashell Phase I: Lashell Score: 10    Lashell Phase II:      Anesthesia Post Evaluation    Comments: POST- ANESTHESIA EVALUATION       Pt Name: Mila Victor  MRN: [id number]  YOB: 1950  Date of evaluation: 7/15/2025  Time:  2:33 PM      /70   Pulse 70   Temp 96.8 °F (36 °C)   Resp (!) 7   Ht 1.676 m (5' 6\")   Wt 94.3 kg (208 lb)   SpO2 92%   BMI 33.57 kg/m²      Consciousness Level  Awake  Cardiopulmonary Status  Stable  Pain Adequately Treated YES  Nausea / Vomiting  NO  Adequate Hydration  YES  Anesthesia Related Complications NONE      Electronically signed by Yajaira Alvarez MD on 7/15/2025 at 2:33 PM           No notable events documented.

## 2025-07-15 NOTE — OP NOTE
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       Procedure(s):  KNEE ARTHROSCOPIC PARTIAL MEDIAL MENISCECTOMY LEFT    Surgeon(s):  Gregory Puentes MD    Assistant:   Resident: Dilcia Leung DO    Anesthesia: General    Estimated Blood Loss (mL): Minimal    Complications: None    Specimens:   * No specimens in log *    Implants:  * No implants in log *      Drains: * No LDAs found *    Findings:  Present At Time Of Surgery (PATOS) (choose all levels that have infection present):  No infection present  Other Findings: Tear of the posterior horn near the root ligament medial meniscus extending towards the body with grade 3 to early grade IV chondromalacia medial femoral condyle    Detailed Description of Procedure:       Patient is a 75 y.o. year old female who presents with a history of pain, locking, and giving away sensations of their left knee. Physical examination was positive for Allen's examination. MRI was consistent with a tear of the posterior horn the medial meniscus as well as significant chondromalacia of the medial compartment. Having failed conservative treatment, it was advised that arthroscopic examination and treatment of their knee would be beneficial and consent was obtained with risk and benefits explained to the patient. The patient was taken tot he operative room and general anesthesia was administered. A tourniquet was placed to the operatives lower extremity and then placed in the leg gardner. The leg was exsanguinated and the tourniquet inflated to the 300mmHg. The leg was the prepped and draped in the usual sterile fashion. Time-out was called to verify laterality.     Medial and lateral

## 2025-07-15 NOTE — INTERVAL H&P NOTE
Update History & Physical    The patient's History and Physical of July 1, 2025 was reviewed with the patient and I examined the patient. There was no change. The surgical site was confirmed by the patient and me. Pt undergoing for KNEE ARTHROSCOPIC PARTIAL MEDIAL MENISCECTOMY LEFT  per Dr. Puentes.   Pt denies fever/chills, chest pain or SOB  Pt NPO since the past midnight, pt took her am medication with sip of water   Denies hx of MRSA infection.  Denies hx of blood clots.  Pt denies taking any blood thinner medication   Denies hx of any other personal or family hx of complications w/anesthesia.   Physical exam remain unchanged including cardiac and pulmonary assessment  See nursing flow sheet for vital sings     Lab Results   Component Value Date    WBC 4.7 07/01/2025    HGB 14.4 07/01/2025    HCT 41.6 07/01/2025    MCV 90.0 07/01/2025     (L) 07/01/2025     Lab Results   Component Value Date/Time     07/01/2025 08:37 AM    K 4.1 07/01/2025 08:37 AM     07/01/2025 08:37 AM    CO2 27 07/01/2025 08:37 AM    BUN 16 07/01/2025 08:37 AM    CREATININE 1.0 07/01/2025 08:37 AM    GLUCOSE 123 07/01/2025 08:37 AM    GLUCOSE 100 10/07/2024 09:14 AM    CALCIUM 9.4 07/01/2025 08:37 AM    LABGLOM 58 07/01/2025 08:37 AM      Electronically signed by ALBERT Carreno CNP on 7/15/2025 at 11:29 AM       Writer spoke with ABDIEL Santos from , notified that SBAR had been filled out in patients chart.

## 2025-07-28 ENCOUNTER — HOSPITAL ENCOUNTER (OUTPATIENT)
Dept: WOMENS IMAGING | Age: 75
Discharge: HOME | End: 2025-07-30
Payer: MEDICARE

## 2025-07-28 VITALS — WEIGHT: 208 LBS | HEIGHT: 66 IN | BODY MASS INDEX: 33.43 KG/M2

## 2025-07-28 DIAGNOSIS — Z12.31 SCREENING MAMMOGRAM, ENCOUNTER FOR: ICD-10-CM

## 2025-07-28 PROCEDURE — 77063 BREAST TOMOSYNTHESIS BI: CPT

## 2025-07-31 ENCOUNTER — OFFICE VISIT (OUTPATIENT)
Dept: ORTHOPEDIC SURGERY | Age: 75
End: 2025-07-31

## 2025-07-31 VITALS — BODY MASS INDEX: 33.59 KG/M2 | HEIGHT: 66 IN | WEIGHT: 209 LBS | RESPIRATION RATE: 14 BRPM

## 2025-07-31 DIAGNOSIS — Z87.828 S/P ARTHROSCOPIC PARTIAL MEDIAL MENISCECTOMY OF LEFT KNEE: Primary | ICD-10-CM

## 2025-07-31 DIAGNOSIS — Z98.890 S/P ARTHROSCOPIC PARTIAL MEDIAL MENISCECTOMY OF LEFT KNEE: Primary | ICD-10-CM

## 2025-07-31 PROCEDURE — 99024 POSTOP FOLLOW-UP VISIT: CPT | Performed by: PHYSICIAN ASSISTANT

## 2025-07-31 ASSESSMENT — ENCOUNTER SYMPTOMS
VOMITING: 0
SHORTNESS OF BREATH: 0
COUGH: 0
COLOR CHANGE: 0

## 2025-07-31 NOTE — PROGRESS NOTES
[facility]  [address] [place], [place] [id number]  Dept: [phone]  Dept Fax: [phone]        Ambulatory Follow Up      Subjective:   Mila Victor is a 75 y.o. year old female who presents to our office today for routine followup regarding her   1. S/P arthroscopic partial medial meniscectomy of left knee    .    Chief Complaint   Patient presents with    Post-Op Check     S/P:Left Arthroscopic Partial medial meniscectomy on 7/15/25. States she is doing good. She does have pain with increased activity, but she feels better than before surgery.        Date of Surgery: 7/15/2025    History of Present Illness  The patient is a 75-year-old female here today for a postoperative follow-up after undergoing a left knee arthroscopy with partial medial meniscectomy on 07/15/2025 with Dr. Puentes. She is currently 2 weeks postoperative.    She reports an overall improvement in her condition, estimating a 75 to 80 percent recovery. However, she still experiences some discomfort, particularly after prolonged activity. The pain is described as dull and achy, with occasional sharp twinges upon standing. She anticipates increased soreness today due to the removal of her stitches and the physical exertion of getting in and out of her car for a dental appointment and this visit.     She has been managing her pain with Norco, which she also uses for back pain. Her pain management specialist is aware of this and has advised her to take one tablet today and none for the next two days. She has been performing exercises at home, including up and down movements, circular motions, and knee bends while seated. She does not keep track of the frequency of these exercises. She uses a walker at home to alleviate pressure on her knee.        Review of Systems   Constitutional:  Negative for activity change and fever.   HENT:  Negative for sneezing.   Patient states she was seen in the ED for back spasms on 01/07/24 and was prescribed Methocarbamol 500 mg. Patient states symptoms are unresolved and c/o severe back pain rated 10+/10.    Patient advised to Go to ED Now and to have another adult drive her to ED facility. Patient also advised to contact the O Triage Service for any worsening symptoms. Patient states understanding of care advice.     Reason for Disposition   SEVERE back pain of sudden onset and age > 60 years    Additional Information   Negative: Passed out (i.e., fainted, collapsed and was not responding)   Negative: Shock suspected (e.g., cold/pale/clammy skin, too weak to stand, low BP, rapid pulse)   Negative: Sounds like a life-threatening emergency to the triager   Negative: Major injury to the back (e.g., MVA, fall > 10 feet or 3 meters, penetrating injury, etc.)   Negative: Pain in the upper back over the ribs (rib cage) that radiates (travels) into the chest   Negative: Pain in the upper back over the ribs (rib cage) and worsened by coughing (or clearly increases with breathing)   Negative: Back pain during pregnancy    Protocols used: Back Pain-A-[place]     Known

## 2025-09-03 ENCOUNTER — OFFICE VISIT (OUTPATIENT)
Dept: ORTHOPEDIC SURGERY | Age: 75
End: 2025-09-03

## 2025-09-03 VITALS — WEIGHT: 208 LBS | BODY MASS INDEX: 33.43 KG/M2 | RESPIRATION RATE: 18 BRPM | HEIGHT: 66 IN

## 2025-09-03 DIAGNOSIS — M25.562 ACUTE PAIN OF LEFT KNEE: ICD-10-CM

## 2025-09-03 DIAGNOSIS — Z98.890 S/P ARTHROSCOPIC PARTIAL MEDIAL MENISCECTOMY OF LEFT KNEE: Primary | ICD-10-CM

## 2025-09-03 DIAGNOSIS — Z87.828 S/P ARTHROSCOPIC PARTIAL MEDIAL MENISCECTOMY OF LEFT KNEE: Primary | ICD-10-CM

## 2025-09-03 RX ORDER — METHYLPREDNISOLONE 4 MG/1
TABLET ORAL
Qty: 1 KIT | Refills: 0 | Status: SHIPPED | OUTPATIENT
Start: 2025-09-03 | End: 2025-09-09

## (undated) DEVICE — Device

## (undated) DEVICE — STRIP,CLOSURE,WOUND,MEDI-STRIP,1/2X4: Brand: MEDLINE

## (undated) DEVICE — HYPODERMIC SAFETY NEEDLE: Brand: MAGELLAN

## (undated) DEVICE — SUTURE VCRL + SZ 3-0 L27IN ABSRB UD L26MM SH 1/2 CIR VCP416H

## (undated) DEVICE — SYRINGE 20ML LL S/C 50

## (undated) DEVICE — SOLUTION IRRIGATION LR 3000 ML USP TITAN XL CONTAINER 4/CA

## (undated) DEVICE — DRESSING,GAUZE,XEROFORM,CURAD,1"X8",ST: Brand: CURAD

## (undated) DEVICE — GLOVE ORTHO 7 1/2   MSG9475

## (undated) DEVICE — ADHESIVE SKIN CLOSURE TOP 36 CC HI VISC DERMBND MINI

## (undated) DEVICE — SINGLE PORT MANIFOLD: Brand: NEPTUNE 2

## (undated) DEVICE — SUTURE PERMAHAND SZ 0 L30IN NONABSORBABLE BLK FSL L30MM 3/8 680H

## (undated) DEVICE — [TOMCAT CUTTER, ARTHROSCOPIC SHAVER BLADE,  DO NOT RESTERILIZE,  DO NOT USE IF PACKAGE IS DAMAGED,  KEEP DRY,  KEEP AWAY FROM SUNLIGHT]: Brand: FORMULA

## (undated) DEVICE — PAD,NON-ADHERENT,3X8,STERILE,LF,1/PK: Brand: MEDLINE

## (undated) DEVICE — DRESSING TRNSPAR W5XL4.5IN FLM SHT SEMIPERMEABLE WIND

## (undated) DEVICE — SOLUTION IRRIG 1000ML 0.9% SOD CHL USP POUR PLAS BTL

## (undated) DEVICE — DRAPE,T,LAPARO,TRANS,STERILE: Brand: MEDLINE

## (undated) DEVICE — SOLUTION IRRIG 1000ML STRL H2O USP PLAS POUR BTL

## (undated) DEVICE — ZIMMER® STERILE DISPOSABLE TOURNIQUET CUFF WITH PLC, DUAL PORT, SINGLE BLADDER, 30 IN. (76 CM)

## (undated) DEVICE — MERCY HEALTH ST CHARLES: Brand: MEDLINE INDUSTRIES, INC.

## (undated) DEVICE — GRID BX L4.65IN RADLUC FOR SAFE IMAG TRNSPRT PROC BRST SPEC

## (undated) DEVICE — GOWN,AURORA,NONREINFORCED,LARGE: Brand: MEDLINE

## (undated) DEVICE — MARKER,SKIN,WI/RULER AND LABELS: Brand: MEDLINE

## (undated) DEVICE — COVER,MAYO STAND,STERILE: Brand: MEDLINE

## (undated) DEVICE — ST CHARLES KNEE ARTHRO: Brand: MEDLINE INDUSTRIES, INC.

## (undated) DEVICE — BLADE ES ELASTOMERIC COAT INSUL DURABLE BEND UPTO 90DEG

## (undated) DEVICE — GLOVE ORTHO 8   MSG9480